# Patient Record
Sex: FEMALE | Race: OTHER
[De-identification: names, ages, dates, MRNs, and addresses within clinical notes are randomized per-mention and may not be internally consistent; named-entity substitution may affect disease eponyms.]

---

## 2017-01-18 ENCOUNTER — RECORD ABSTRACTING (OUTPATIENT)
Age: 42
End: 2017-01-18

## 2017-01-18 DIAGNOSIS — Z12.4 ENCOUNTER FOR SCREENING FOR MALIGNANT NEOPLASM OF CERVIX: ICD-10-CM

## 2017-02-02 ENCOUNTER — APPOINTMENT (OUTPATIENT)
Dept: INTERNAL MEDICINE | Facility: CLINIC | Age: 42
End: 2017-02-02

## 2017-02-02 VITALS
WEIGHT: 166 LBS | DIASTOLIC BLOOD PRESSURE: 68 MMHG | SYSTOLIC BLOOD PRESSURE: 107 MMHG | HEART RATE: 69 BPM | HEIGHT: 58 IN | BODY MASS INDEX: 34.85 KG/M2

## 2017-02-02 DIAGNOSIS — Z87.19 PERSONAL HISTORY OF OTHER DISEASES OF THE DIGESTIVE SYSTEM: ICD-10-CM

## 2017-02-02 DIAGNOSIS — Z83.3 FAMILY HISTORY OF DIABETES MELLITUS: ICD-10-CM

## 2017-02-08 ENCOUNTER — RESULT REVIEW (OUTPATIENT)
Age: 42
End: 2017-02-08

## 2017-02-09 LAB
ALBUMIN SERPL-MCNC: 4.2 G/DL
ALBUMIN/GLOB SERPL: 1.45
ALP SERPL-CCNC: 101 IU/L
ALT SERPL-CCNC: 29 IU/L
ANION GAP SERPL CALC-SCNC: 8 MEQ/L
AST SERPL-CCNC: 24 IU/L
BASOPHILS # BLD: 0.02 TH/MM3
BASOPHILS NFR BLD: 0.3 %
BILIRUB SERPL-MCNC: 0.4 MG/DL
BUN SERPL-MCNC: 7 MG/DL
BUN/CREAT SERPL: 11.1 %
CALCIUM SERPL-MCNC: 9.2 MG/DL
CHLORIDE SERPL-SCNC: 108 MEQ/L
CHOLEST SERPL-MCNC: 205 MG/DL
CO2 SERPL-SCNC: 23 MEQ/L
CREAT SERPL-MCNC: 0.63 MG/DL
EOSINOPHIL # BLD: 0.19 TH/MM3
EOSINOPHIL NFR BLD: 2.6 %
ERYTHROCYTE [DISTWIDTH] IN BLOOD BY AUTOMATED COUNT: 14.7 %
ESTIMATED AVERGAGE GLUCOSE (NORTH): 111 MG/DL
GFR SERPL CREATININE-BSD FRML MDRD: 105
GLUCOSE SERPL-MCNC: 85 MG/DL
GRANULOCYTES # BLD: 4.57 TH/MM3
GRANULOCYTES NFR BLD: 63.2 %
HBA1C MFR BLD: 5.5 %
HCT VFR BLD AUTO: 39.8 %
HDLC SERPL-MCNC: 69 MG/DL
HDLC SERPL: 2.97
HGB BLD-MCNC: 12.8 G/DL
IMM GRANULOCYTES # BLD: 0.02 TH/MM3
IMM GRANULOCYTES NFR BLD: 0.3 %
LDLC SERPL DIRECT ASSAY-MCNC: 126 MG/DL
LYMPHOCYTES # BLD: 2.03 TH/MM3
LYMPHOCYTES NFR BLD: 28.1 %
MCH RBC QN AUTO: 26.2 PG
MCHC RBC AUTO-ENTMCNC: 32.2 G/DL
MCV RBC AUTO: 81.4 FL
MONOCYTES # BLD: 0.4 TH/MM3
MONOCYTES NFR BLD: 5.5 %
PLATELET # BLD: 260 TH/MM3
PMV BLD AUTO: 11.4 FL
POTASSIUM SERPL-SCNC: 4 MMOL/L
PROT SERPL-MCNC: 7.1 G/DL
RBC # BLD AUTO: 4.89 MIL/MM3
SODIUM SERPL-SCNC: 139 MEQ/L
TRIGL SERPL-MCNC: 69 MG/DL
TSH SERPL DL<=0.005 MIU/L-ACNC: 2.12 UIU/ML
VLDLC SERPL-MCNC: 13 MG/DL
WBC # BLD: 7.23 TH/MM3

## 2017-02-13 LAB
1,25(OH)2D2 SERPL-MCNC: < 8 PG/ML
1,25(OH)2D3 SERPL-MCNC: 68 PG/ML
25(OH)D3+25(OH)D2 SERPL-MCNC: 68 PG/ML

## 2017-02-16 ENCOUNTER — APPOINTMENT (OUTPATIENT)
Dept: INTERNAL MEDICINE | Facility: CLINIC | Age: 42
End: 2017-02-16

## 2017-03-06 ENCOUNTER — APPOINTMENT (OUTPATIENT)
Dept: OBGYN | Facility: CLINIC | Age: 42
End: 2017-03-06

## 2017-03-08 ENCOUNTER — OTHER (OUTPATIENT)
Age: 42
End: 2017-03-08

## 2017-04-03 ENCOUNTER — APPOINTMENT (OUTPATIENT)
Dept: OBGYN | Facility: CLINIC | Age: 42
End: 2017-04-03

## 2017-04-03 ENCOUNTER — OUTPATIENT (OUTPATIENT)
Dept: OUTPATIENT SERVICES | Facility: HOSPITAL | Age: 42
LOS: 1 days | Discharge: HOME | End: 2017-04-03

## 2017-04-03 VITALS — WEIGHT: 164 LBS | DIASTOLIC BLOOD PRESSURE: 64 MMHG | BODY MASS INDEX: 34.28 KG/M2 | SYSTOLIC BLOOD PRESSURE: 110 MMHG

## 2017-04-04 ENCOUNTER — RESULT REVIEW (OUTPATIENT)
Age: 42
End: 2017-04-04

## 2017-04-14 ENCOUNTER — RECORD ABSTRACTING (OUTPATIENT)
Age: 42
End: 2017-04-14

## 2017-04-14 DIAGNOSIS — Z87.19 PERSONAL HISTORY OF OTHER DISEASES OF THE DIGESTIVE SYSTEM: ICD-10-CM

## 2017-04-14 DIAGNOSIS — N63 UNSPECIFIED LUMP IN BREAST: ICD-10-CM

## 2017-04-17 LAB — HPV E6+E7 MRNA CVX QL NAA+PROBE: NOT DETECTED

## 2017-06-27 DIAGNOSIS — Z01.419 ENCOUNTER FOR GYNECOLOGICAL EXAMINATION (GENERAL) (ROUTINE) WITHOUT ABNORMAL FINDINGS: ICD-10-CM

## 2017-08-24 ENCOUNTER — OUTPATIENT (OUTPATIENT)
Dept: OUTPATIENT SERVICES | Facility: HOSPITAL | Age: 42
LOS: 1 days | Discharge: HOME | End: 2017-08-24

## 2017-08-24 DIAGNOSIS — O14.00 MILD TO MODERATE PRE-ECLAMPSIA, UNSPECIFIED TRIMESTER: ICD-10-CM

## 2017-12-28 ENCOUNTER — APPOINTMENT (OUTPATIENT)
Dept: INTERNAL MEDICINE | Facility: CLINIC | Age: 42
End: 2017-12-28

## 2017-12-28 ENCOUNTER — OUTPATIENT (OUTPATIENT)
Dept: OUTPATIENT SERVICES | Facility: HOSPITAL | Age: 42
LOS: 1 days | Discharge: HOME | End: 2017-12-28

## 2017-12-28 VITALS
WEIGHT: 166 LBS | HEIGHT: 58 IN | DIASTOLIC BLOOD PRESSURE: 74 MMHG | HEART RATE: 75 BPM | SYSTOLIC BLOOD PRESSURE: 121 MMHG | BODY MASS INDEX: 34.85 KG/M2

## 2017-12-28 DIAGNOSIS — O14.00 MILD TO MODERATE PRE-ECLAMPSIA, UNSPECIFIED TRIMESTER: ICD-10-CM

## 2018-01-03 ENCOUNTER — APPOINTMENT (OUTPATIENT)
Dept: SURGERY | Facility: CLINIC | Age: 43
End: 2018-01-03
Payer: COMMERCIAL

## 2018-01-03 VITALS
WEIGHT: 168 LBS | SYSTOLIC BLOOD PRESSURE: 118 MMHG | DIASTOLIC BLOOD PRESSURE: 76 MMHG | HEIGHT: 58 IN | BODY MASS INDEX: 35.26 KG/M2

## 2018-01-03 PROCEDURE — 99213 OFFICE O/P EST LOW 20 MIN: CPT

## 2018-03-15 ENCOUNTER — APPOINTMENT (OUTPATIENT)
Dept: INTERNAL MEDICINE | Facility: CLINIC | Age: 43
End: 2018-03-15

## 2018-03-30 ENCOUNTER — APPOINTMENT (OUTPATIENT)
Dept: PULMONOLOGY | Facility: CLINIC | Age: 43
End: 2018-03-30

## 2018-10-01 ENCOUNTER — FORM ENCOUNTER (OUTPATIENT)
Age: 43
End: 2018-10-01

## 2018-10-02 ENCOUNTER — OUTPATIENT (OUTPATIENT)
Dept: OUTPATIENT SERVICES | Facility: HOSPITAL | Age: 43
LOS: 1 days | Discharge: HOME | End: 2018-10-02

## 2018-10-02 ENCOUNTER — APPOINTMENT (OUTPATIENT)
Dept: INTERNAL MEDICINE | Facility: CLINIC | Age: 43
End: 2018-10-02

## 2018-10-02 VITALS
HEIGHT: 58 IN | HEART RATE: 90 BPM | WEIGHT: 170 LBS | SYSTOLIC BLOOD PRESSURE: 118 MMHG | DIASTOLIC BLOOD PRESSURE: 79 MMHG | BODY MASS INDEX: 35.68 KG/M2

## 2018-10-02 DIAGNOSIS — M25.561 PAIN IN RIGHT KNEE: ICD-10-CM

## 2018-10-02 DIAGNOSIS — Z87.898 PERSONAL HISTORY OF OTHER SPECIFIED CONDITIONS: ICD-10-CM

## 2018-10-02 DIAGNOSIS — M25.562 PAIN IN LEFT KNEE: ICD-10-CM

## 2018-10-02 DIAGNOSIS — R06.02 SHORTNESS OF BREATH: ICD-10-CM

## 2018-10-02 RX ORDER — COPPER 313.4 MG/1
INTRAUTERINE DEVICE INTRAUTERINE
Refills: 0 | Status: DISCONTINUED | COMMUNITY
End: 2018-10-02

## 2018-10-02 NOTE — HISTORY OF PRESENT ILLNESS
[de-identified] : This is a 42 year old female patient with hx of DLD, obesity presenting for follow up.\par At this visit she is only complaining of pain in bilateral knees and sometimes in the thighs and calfs as well. The pain is worse when climbing the stairs. No morning stiffness. She takes naproxen sometimes which helps with the pain. No fever, chills or constitutional symptoms.

## 2018-10-02 NOTE — PHYSICAL EXAM

## 2018-10-02 NOTE — ASSESSMENT
[FreeTextEntry1] : This is a 42 year old female patient with hx of DLD, obesity presenting for follow up.\par \par #bilateral knee pain\par may be related to OA and obesity\par will get xray of the knees B/L\par \par #obesity/ DLD\par advised to lose weight\par counseled about diet/ exercise\par repeat lipid panel\par \par #scalp nodule\par seen by GS in January 2018, likely lipoma\par recommend monitoring and resection if enlarging\par \par #HCM\par flu vaccine given today\par PAP up to date\par will refer for screening mammography\par routine labs\par f/u in 6 months

## 2018-10-03 DIAGNOSIS — D17.9 BENIGN LIPOMATOUS NEOPLASM, UNSPECIFIED: ICD-10-CM

## 2018-10-03 DIAGNOSIS — E78.5 HYPERLIPIDEMIA, UNSPECIFIED: ICD-10-CM

## 2018-10-03 DIAGNOSIS — Z23 ENCOUNTER FOR IMMUNIZATION: ICD-10-CM

## 2018-10-10 ENCOUNTER — LABORATORY RESULT (OUTPATIENT)
Age: 43
End: 2018-10-10

## 2018-10-11 ENCOUNTER — FORM ENCOUNTER (OUTPATIENT)
Age: 43
End: 2018-10-11

## 2018-10-11 LAB
25(OH)D3 SERPL-MCNC: 11 NG/ML
ALBUMIN SERPL ELPH-MCNC: 4.5 G/DL
ALP BLD-CCNC: 85 U/L
ALT SERPL-CCNC: 32 U/L
ANION GAP SERPL CALC-SCNC: 14 MMOL/L
AST SERPL-CCNC: 22 U/L
BILIRUB SERPL-MCNC: 0.2 MG/DL
BUN SERPL-MCNC: 8 MG/DL
CALCIUM SERPL-MCNC: 8.5 MG/DL
CHLORIDE SERPL-SCNC: 105 MMOL/L
CHOLEST SERPL-MCNC: 169 MG/DL
CHOLEST/HDLC SERPL: 3.4 RATIO
CO2 SERPL-SCNC: 21 MMOL/L
CREAT SERPL-MCNC: 0.5 MG/DL
GLUCOSE SERPL-MCNC: 92 MG/DL
HDLC SERPL-MCNC: 50 MG/DL
LDLC SERPL CALC-MCNC: 119 MG/DL
POTASSIUM SERPL-SCNC: 4.2 MMOL/L
PROT SERPL-MCNC: 7.2 G/DL
SODIUM SERPL-SCNC: 140 MMOL/L
TRIGL SERPL-MCNC: 81 MG/DL
TSH SERPL-ACNC: 1.88 UIU/ML

## 2018-10-12 ENCOUNTER — OUTPATIENT (OUTPATIENT)
Dept: OUTPATIENT SERVICES | Facility: HOSPITAL | Age: 43
LOS: 1 days | Discharge: HOME | End: 2018-10-12

## 2018-10-12 DIAGNOSIS — Z12.31 ENCOUNTER FOR SCREENING MAMMOGRAM FOR MALIGNANT NEOPLASM OF BREAST: ICD-10-CM

## 2018-10-17 ENCOUNTER — APPOINTMENT (OUTPATIENT)
Dept: NUTRITION | Facility: CLINIC | Age: 43
End: 2018-10-17

## 2018-10-17 VITALS — HEIGHT: 57 IN | BODY MASS INDEX: 38.4 KG/M2 | WEIGHT: 178 LBS

## 2018-11-12 ENCOUNTER — RESULT CHARGE (OUTPATIENT)
Age: 43
End: 2018-11-12

## 2018-11-12 ENCOUNTER — APPOINTMENT (OUTPATIENT)
Dept: OBGYN | Facility: CLINIC | Age: 43
End: 2018-11-12

## 2018-11-12 ENCOUNTER — OUTPATIENT (OUTPATIENT)
Dept: OUTPATIENT SERVICES | Facility: HOSPITAL | Age: 43
LOS: 1 days | Discharge: HOME | End: 2018-11-12

## 2018-11-12 VITALS
BODY MASS INDEX: 38.62 KG/M2 | DIASTOLIC BLOOD PRESSURE: 70 MMHG | HEIGHT: 57 IN | SYSTOLIC BLOOD PRESSURE: 108 MMHG | WEIGHT: 179 LBS

## 2018-11-12 DIAGNOSIS — Z30.432 ENCOUNTER FOR REMOVAL OF INTRAUTERINE CONTRACEPTIVE DEVICE: ICD-10-CM

## 2018-11-12 RX ADMIN — NORGESTIMATE AND ETHINYL ESTRADIOL 0 MG-25 MCG: KIT at 00:00

## 2018-11-13 LAB
HCG UR QL: NEGATIVE
QUALITY CONTROL: YES

## 2018-11-14 DIAGNOSIS — Z01.419 ENCOUNTER FOR GYNECOLOGICAL EXAMINATION (GENERAL) (ROUTINE) WITHOUT ABNORMAL FINDINGS: ICD-10-CM

## 2018-11-14 DIAGNOSIS — Z30.432 ENCOUNTER FOR REMOVAL OF INTRAUTERINE CONTRACEPTIVE DEVICE: ICD-10-CM

## 2019-01-16 ENCOUNTER — APPOINTMENT (OUTPATIENT)
Dept: NUTRITION | Facility: CLINIC | Age: 44
End: 2019-01-16

## 2019-01-16 VITALS — WEIGHT: 178 LBS | BODY MASS INDEX: 38.4 KG/M2 | HEIGHT: 57 IN

## 2019-03-13 LAB
BASOPHILS # BLD AUTO: 0.04 K/UL
BASOPHILS NFR BLD AUTO: 0.5 %
EOSINOPHIL # BLD AUTO: 0.26 K/UL
EOSINOPHIL NFR BLD AUTO: 3.6 %
HCT VFR BLD CALC: 36.6 %
HGB BLD-MCNC: 11.9 G/DL
IMM GRANULOCYTES NFR BLD AUTO: 0.4 %
LYMPHOCYTES # BLD AUTO: 1.78 K/UL
LYMPHOCYTES NFR BLD AUTO: 24.4 %
MAN DIFF?: NORMAL
MCHC RBC-ENTMCNC: 26.2 PG
MCHC RBC-ENTMCNC: 32.5 G/DL
MCV RBC AUTO: 80.6 FL
MONOCYTES # BLD AUTO: 0.44 K/UL
MONOCYTES NFR BLD AUTO: 6 %
NEUTROPHILS # BLD AUTO: 4.75 K/UL
NEUTROPHILS NFR BLD AUTO: 65.1 %
PLATELET # BLD AUTO: 257 K/UL
RBC # BLD: 4.54 M/UL
RBC # FLD: 14.2 %
WBC # FLD AUTO: 7.3 K/UL

## 2019-03-20 ENCOUNTER — APPOINTMENT (OUTPATIENT)
Dept: INTERNAL MEDICINE | Facility: CLINIC | Age: 44
End: 2019-03-20

## 2019-05-09 ENCOUNTER — APPOINTMENT (OUTPATIENT)
Dept: INTERNAL MEDICINE | Facility: CLINIC | Age: 44
End: 2019-05-09

## 2019-05-09 ENCOUNTER — OUTPATIENT (OUTPATIENT)
Dept: OUTPATIENT SERVICES | Facility: HOSPITAL | Age: 44
LOS: 1 days | Discharge: HOME | End: 2019-05-09

## 2019-05-09 VITALS
DIASTOLIC BLOOD PRESSURE: 78 MMHG | HEART RATE: 67 BPM | SYSTOLIC BLOOD PRESSURE: 118 MMHG | WEIGHT: 179 LBS | BODY MASS INDEX: 38.62 KG/M2 | HEIGHT: 57 IN

## 2019-05-09 DIAGNOSIS — D17.0 BENIGN LIPOMATOUS NEOPLASM OF SKIN AND SUBCUTANEOUS TISSUE OF HEAD, FACE AND NECK: ICD-10-CM

## 2019-05-09 NOTE — ASSESSMENT
[FreeTextEntry1] : Patient is a 44y/o Obese Female with medical Hx Dyslipidemia, B/L Knee Osteoarthritis  presenting to the clinic for a follow up visit \par \par Morbid Obesity: BMI:38.7\par -Counselled on Better food choices and  Exercises\par \par  Dyslipidemia\par -Last Lipid panel Improved\par -will send new labs\par \par B/l Knee OA\par -Shown on X-ray\par -Weight loss and tylenol advised for pain. \par \par Acid Reflux \par -Improves with OTC PPI(Prilosec)\par -C/W PPI\par \par Suspect Vit D Insufficiency\par -VIT D 25-OH from 2018: 11\par -Will recheck and call patient if value is still low \par \par Scalp Mass:\par Now it is increasing and painful so will get sono and refer back to GS. I explained everything in German and pt verbalized understanding that it is important to f/up with general surgery. \par \par HCM\par -Routine labs ordered\par -JONATHAN done 10/12/2018: No malignancy: give referral on next visit \par -Pap: 2017:Unremarkable,  HPV neg: Repeat 2020\par -RTC in 6 Months\par \par

## 2019-05-09 NOTE — REVIEW OF SYSTEMS
[Heartburn] : heartburn [Joint Pain] : joint pain [Fever] : no fever [Chills] : no chills [Fatigue] : no fatigue [Night Sweats] : no night sweats [Chest Pain] : no chest pain [Palpitations] : no palpitations [Lower Ext Edema] : no lower extremity edema [Shortness Of Breath] : no shortness of breath [Wheezing] : no wheezing [Cough] : no cough [Dyspnea on Exertion] : no dyspnea on exertion [Abdominal Pain] : no abdominal pain [Nausea] : no nausea [Diarrhea] : diarrhea [FreeTextEntry7] : Abdominal Obesity, Acid reflux [FreeTextEntry9] : B/L knee pain worse on the rIght

## 2019-05-09 NOTE — HISTORY OF PRESENT ILLNESS
[FreeTextEntry1] : Follow-up Visit [de-identified] : Patient is a 42y/o Obese Female with medical Hx Dyslipidemia, Newly diagnosed B/L Knee Osteoarthritis, presenting to the clinic for a follow up visit. she is complaining of acid reflux and has been talking OTC Prilosec which has been helping. She is also complaining of B/L Knee pain which is worse on the Right. She takes no other medication.\par Scalp mass is increasing in size and now it is painful also.

## 2019-05-09 NOTE — PHYSICAL EXAM
[No Acute Distress] : no acute distress [Well Nourished] : well nourished [Well-Appearing] : well-appearing [No Respiratory Distress] : no respiratory distress  [No Accessory Muscle Use] : no accessory muscle use [Clear to Auscultation] : lungs were clear to auscultation bilaterally [Normal Rate] : normal rate  [Regular Rhythm] : with a regular rhythm [Normal S1, S2] : normal S1 and S2 [Soft] : abdomen soft [Non Tender] : non-tender [Non-distended] : non-distended [No Joint Swelling] : no joint swelling [de-identified] : Pain at medial Knee on the right  [de-identified] : Scalp: Mass palpable.

## 2019-05-13 ENCOUNTER — APPOINTMENT (OUTPATIENT)
Dept: OBGYN | Facility: CLINIC | Age: 44
End: 2019-05-13

## 2019-05-13 DIAGNOSIS — M17.0 BILATERAL PRIMARY OSTEOARTHRITIS OF KNEE: ICD-10-CM

## 2019-05-13 DIAGNOSIS — E78.5 HYPERLIPIDEMIA, UNSPECIFIED: ICD-10-CM

## 2019-05-13 DIAGNOSIS — R22.9 LOCALIZED SWELLING, MASS AND LUMP, UNSPECIFIED: ICD-10-CM

## 2019-05-15 ENCOUNTER — APPOINTMENT (OUTPATIENT)
Dept: OBGYN | Facility: CLINIC | Age: 44
End: 2019-05-15

## 2019-05-30 LAB
BASOPHILS # BLD AUTO: 0.03 K/UL
BASOPHILS NFR BLD AUTO: 0.4 %
EOSINOPHIL # BLD AUTO: 0.21 K/UL
EOSINOPHIL NFR BLD AUTO: 2.8 %
HCT VFR BLD CALC: 38.6 %
HGB BLD-MCNC: 12.7 G/DL
IMM GRANULOCYTES NFR BLD AUTO: 0.3 %
LYMPHOCYTES # BLD AUTO: 1.74 K/UL
LYMPHOCYTES NFR BLD AUTO: 22.9 %
MAN DIFF?: NORMAL
MCHC RBC-ENTMCNC: 26.2 PG
MCHC RBC-ENTMCNC: 32.9 G/DL
MCV RBC AUTO: 79.8 FL
MONOCYTES # BLD AUTO: 0.44 K/UL
MONOCYTES NFR BLD AUTO: 5.8 %
NEUTROPHILS # BLD AUTO: 5.15 K/UL
NEUTROPHILS NFR BLD AUTO: 67.8 %
PLATELET # BLD AUTO: 250 K/UL
RBC # BLD: 4.84 M/UL
RBC # FLD: 13.6 %
WBC # FLD AUTO: 7.59 K/UL

## 2019-06-01 LAB
25(OH)D3 SERPL-MCNC: 13 NG/ML
ALBUMIN SERPL ELPH-MCNC: 4.3 G/DL
ALP BLD-CCNC: 91 U/L
ALT SERPL-CCNC: 27 U/L
ANION GAP SERPL CALC-SCNC: 11 MMOL/L
AST SERPL-CCNC: 19 U/L
BILIRUB SERPL-MCNC: <0.2 MG/DL
BUN SERPL-MCNC: 8 MG/DL
CALCIUM SERPL-MCNC: 8.5 MG/DL
CHLORIDE SERPL-SCNC: 106 MMOL/L
CHOLEST SERPL-MCNC: 180 MG/DL
CHOLEST/HDLC SERPL: 3.3 RATIO
CO2 SERPL-SCNC: 23 MMOL/L
CREAT SERPL-MCNC: 0.5 MG/DL
ESTIMATED AVERAGE GLUCOSE: 131 MG/DL
GLUCOSE SERPL-MCNC: 93 MG/DL
HBA1C MFR BLD HPLC: 6.2 %
HDLC SERPL-MCNC: 54 MG/DL
LDLC SERPL CALC-MCNC: 125 MG/DL
POTASSIUM SERPL-SCNC: 4.2 MMOL/L
PROT SERPL-MCNC: 7.3 G/DL
SODIUM SERPL-SCNC: 140 MMOL/L
TRIGL SERPL-MCNC: 95 MG/DL
TSH SERPL-ACNC: 1.52 UIU/ML

## 2019-06-10 ENCOUNTER — FORM ENCOUNTER (OUTPATIENT)
Age: 44
End: 2019-06-10

## 2019-06-11 ENCOUNTER — OUTPATIENT (OUTPATIENT)
Dept: OUTPATIENT SERVICES | Facility: HOSPITAL | Age: 44
LOS: 1 days | Discharge: HOME | End: 2019-06-11

## 2019-06-11 ENCOUNTER — OUTPATIENT (OUTPATIENT)
Dept: OUTPATIENT SERVICES | Facility: HOSPITAL | Age: 44
LOS: 1 days | Discharge: HOME | End: 2019-06-11
Payer: SUBSIDIZED

## 2019-06-11 DIAGNOSIS — D17.0 BENIGN LIPOMATOUS NEOPLASM OF SKIN AND SUBCUTANEOUS TISSUE OF HEAD, FACE AND NECK: ICD-10-CM

## 2019-06-11 PROCEDURE — 76536 US EXAM OF HEAD AND NECK: CPT | Mod: 26

## 2019-06-12 ENCOUNTER — APPOINTMENT (OUTPATIENT)
Dept: SURGERY | Facility: CLINIC | Age: 44
End: 2019-06-12
Payer: SUBSIDIZED

## 2019-06-12 VITALS
DIASTOLIC BLOOD PRESSURE: 76 MMHG | BODY MASS INDEX: 39.48 KG/M2 | SYSTOLIC BLOOD PRESSURE: 116 MMHG | HEIGHT: 57 IN | WEIGHT: 183 LBS

## 2019-06-12 PROCEDURE — 99214 OFFICE O/P EST MOD 30 MIN: CPT

## 2019-06-12 NOTE — HISTORY OF PRESENT ILLNESS
[de-identified] : Ivone is a 42 year old lady who had excision of a mass in the scalp that was diagnosed as schwannoma on pathology in 2015. She now presents with this asymptomatic nodule in the back of her scalp. This is barely palpable and does not bother her. This is unlike the first mass that was excised as the previous one was painful and this is painless. \par \par This time she feels some tenderness and causing headache.

## 2019-06-12 NOTE — PHYSICAL EXAM
[Respiratory Effort] : normal respiratory effort [Normal Rate and Rhythm] : normal rate and rhythm [Oriented to Person] : oriented to person [Calm] : calm [Abdominal Masses] : No abdominal masses [JVD] : no jugular venous distention  [Tender] : was nontender [Stool Sample Taken] : No stool obtained  on rectal exam [de-identified] : Normal [Purpura] : no purpura  [de-identified] : Normal [de-identified] : Normal [de-identified] : Normal except  1 inch schar from the first surgery and a less than 1 cm painless nodule a inch below it.

## 2019-06-12 NOTE — PLAN
[FreeTextEntry1] : Follow up in 6 moths to a years time. \par If it becomes symptomatic she should come back earlier. \par

## 2019-06-12 NOTE — ASSESSMENT
[FreeTextEntry1] : Ivone is a 42 year old lady who had excision of a mass in the scalp that was diagnosed as schwannoma on pathology in 2015. She now presents with this asymptomatic nodule in the back of her scalp. This is barely palpable and does not bother her. This is unlike the first mass that was excised as the previous one was painful and this is painless. \par \par This time she feels some tenderness and causing headache. \par \par We discussed this is most probably a benign process and as it is not bothering her surgical resection would have questionable benefit.\par She wants it removed. \par \par We explained in great detail the pathophysiology of the disease process. We star diagrams and discussed the workup for diagnosis and management.\par The Procedure was explained to the patient. The Risk , benefit and alternatives were discussed. We discussed recovery and possible complications.\par The Post operative care was explained to the patient. She was counselled on diet , exercise and wound care.\par We discussed the pathology and surgery with her.\par \par We will schedule her for excsion of scalp mass\par \par We discussed the importance of close follow up. \par We informed that she needs to follow up in OR  .\par We also informed that she can call us if anything changes or has any questions. \par \par We explained in great detail the pathophysiology of the disease process. We star diagrams and discussed the workup for diagnosis and management.\par The Procedure was explained to the patient. The Risk , benefit and alternatives were discussed. We discussed recovery and possible complications.\par The Post operative care was explained to the patient. She was counselled on diet , exercise and wound care.\par We discussed the pathology and surgery with her.\par \par We discussed the importance of close follow up. \par We informed that she needs to follow up in OR. \par We also informed that she can call us if anything changes or has any questions.\par

## 2019-06-21 ENCOUNTER — OUTPATIENT (OUTPATIENT)
Dept: OUTPATIENT SERVICES | Facility: HOSPITAL | Age: 44
LOS: 1 days | Discharge: HOME | End: 2019-06-21
Payer: SUBSIDIZED

## 2019-06-21 VITALS
TEMPERATURE: 98 F | OXYGEN SATURATION: 100 % | DIASTOLIC BLOOD PRESSURE: 78 MMHG | RESPIRATION RATE: 22 BRPM | WEIGHT: 182.1 LBS | HEART RATE: 72 BPM | SYSTOLIC BLOOD PRESSURE: 115 MMHG

## 2019-06-21 DIAGNOSIS — Z01.818 ENCOUNTER FOR OTHER PREPROCEDURAL EXAMINATION: ICD-10-CM

## 2019-06-21 DIAGNOSIS — R22.0 LOCALIZED SWELLING, MASS AND LUMP, HEAD: ICD-10-CM

## 2019-06-21 DIAGNOSIS — R22.0 LOCALIZED SWELLING, MASS AND LUMP, HEAD: Chronic | ICD-10-CM

## 2019-06-21 LAB
ALBUMIN SERPL ELPH-MCNC: 4 G/DL — SIGNIFICANT CHANGE UP (ref 3.5–5.2)
ALP SERPL-CCNC: 89 U/L — SIGNIFICANT CHANGE UP (ref 30–115)
ALT FLD-CCNC: 27 U/L — SIGNIFICANT CHANGE UP (ref 0–41)
ANION GAP SERPL CALC-SCNC: 11 MMOL/L — SIGNIFICANT CHANGE UP (ref 7–14)
APTT BLD: 33.5 SEC — SIGNIFICANT CHANGE UP (ref 27–39.2)
AST SERPL-CCNC: 19 U/L — SIGNIFICANT CHANGE UP (ref 0–41)
BASOPHILS # BLD AUTO: 0.02 K/UL — SIGNIFICANT CHANGE UP (ref 0–0.2)
BASOPHILS NFR BLD AUTO: 0.3 % — SIGNIFICANT CHANGE UP (ref 0–1)
BILIRUB SERPL-MCNC: 0.2 MG/DL — SIGNIFICANT CHANGE UP (ref 0.2–1.2)
BUN SERPL-MCNC: 8 MG/DL — LOW (ref 10–20)
CALCIUM SERPL-MCNC: 8.8 MG/DL — SIGNIFICANT CHANGE UP (ref 8.5–10.1)
CHLORIDE SERPL-SCNC: 107 MMOL/L — SIGNIFICANT CHANGE UP (ref 98–110)
CO2 SERPL-SCNC: 21 MMOL/L — SIGNIFICANT CHANGE UP (ref 17–32)
CREAT SERPL-MCNC: 0.5 MG/DL — LOW (ref 0.7–1.5)
EOSINOPHIL # BLD AUTO: 0.23 K/UL — SIGNIFICANT CHANGE UP (ref 0–0.7)
EOSINOPHIL NFR BLD AUTO: 3.2 % — SIGNIFICANT CHANGE UP (ref 0–8)
GLUCOSE SERPL-MCNC: 91 MG/DL — SIGNIFICANT CHANGE UP (ref 70–99)
HCT VFR BLD CALC: 38.1 % — SIGNIFICANT CHANGE UP (ref 37–47)
HGB BLD-MCNC: 12.5 G/DL — SIGNIFICANT CHANGE UP (ref 12–16)
IMM GRANULOCYTES NFR BLD AUTO: 0.3 % — SIGNIFICANT CHANGE UP (ref 0.1–0.3)
INR BLD: 0.98 RATIO — SIGNIFICANT CHANGE UP (ref 0.65–1.3)
LYMPHOCYTES # BLD AUTO: 1.57 K/UL — SIGNIFICANT CHANGE UP (ref 1.2–3.4)
LYMPHOCYTES # BLD AUTO: 21.8 % — SIGNIFICANT CHANGE UP (ref 20.5–51.1)
MCHC RBC-ENTMCNC: 26.3 PG — LOW (ref 27–31)
MCHC RBC-ENTMCNC: 32.8 G/DL — SIGNIFICANT CHANGE UP (ref 32–37)
MCV RBC AUTO: 80.2 FL — LOW (ref 81–99)
MONOCYTES # BLD AUTO: 0.47 K/UL — SIGNIFICANT CHANGE UP (ref 0.1–0.6)
MONOCYTES NFR BLD AUTO: 6.5 % — SIGNIFICANT CHANGE UP (ref 1.7–9.3)
NEUTROPHILS # BLD AUTO: 4.89 K/UL — SIGNIFICANT CHANGE UP (ref 1.4–6.5)
NEUTROPHILS NFR BLD AUTO: 67.9 % — SIGNIFICANT CHANGE UP (ref 42.2–75.2)
NRBC # BLD: 0 /100 WBCS — SIGNIFICANT CHANGE UP (ref 0–0)
PLATELET # BLD AUTO: 241 K/UL — SIGNIFICANT CHANGE UP (ref 130–400)
POTASSIUM SERPL-MCNC: 4.2 MMOL/L — SIGNIFICANT CHANGE UP (ref 3.5–5)
POTASSIUM SERPL-SCNC: 4.2 MMOL/L — SIGNIFICANT CHANGE UP (ref 3.5–5)
PROT SERPL-MCNC: 6.9 G/DL — SIGNIFICANT CHANGE UP (ref 6–8)
PROTHROM AB SERPL-ACNC: 11.3 SEC — SIGNIFICANT CHANGE UP (ref 9.95–12.87)
RBC # BLD: 4.75 M/UL — SIGNIFICANT CHANGE UP (ref 4.2–5.4)
RBC # FLD: 13.7 % — SIGNIFICANT CHANGE UP (ref 11.5–14.5)
SODIUM SERPL-SCNC: 139 MMOL/L — SIGNIFICANT CHANGE UP (ref 135–146)
WBC # BLD: 7.2 K/UL — SIGNIFICANT CHANGE UP (ref 4.8–10.8)
WBC # FLD AUTO: 7.2 K/UL — SIGNIFICANT CHANGE UP (ref 4.8–10.8)

## 2019-06-21 PROCEDURE — 93010 ELECTROCARDIOGRAM REPORT: CPT

## 2019-06-21 NOTE — H&P PST ADULT - HISTORY OF PRESENT ILLNESS
44 y/o female scheduled for excision of scalp mass reports she has had this mass x 1 yr  reports it has increases in size  reports no c/o cp,sob,palpitations,cough or dysuria  1-2 fos without sob

## 2019-07-01 PROBLEM — K58.9 IRRITABLE BOWEL SYNDROME, UNSPECIFIED: Chronic | Status: ACTIVE | Noted: 2019-06-21

## 2019-07-01 PROBLEM — K58.9 IRRITABLE BOWEL SYNDROME WITHOUT DIARRHEA: Chronic | Status: ACTIVE | Noted: 2019-06-21

## 2019-07-02 ENCOUNTER — RESULT REVIEW (OUTPATIENT)
Age: 44
End: 2019-07-02

## 2019-07-02 ENCOUNTER — APPOINTMENT (OUTPATIENT)
Dept: SURGERY | Facility: AMBULATORY SURGERY CENTER | Age: 44
End: 2019-07-02

## 2019-07-02 ENCOUNTER — OUTPATIENT (OUTPATIENT)
Dept: OUTPATIENT SERVICES | Facility: HOSPITAL | Age: 44
LOS: 1 days | Discharge: HOME | End: 2019-07-02
Payer: SUBSIDIZED

## 2019-07-02 VITALS
OXYGEN SATURATION: 96 % | DIASTOLIC BLOOD PRESSURE: 67 MMHG | HEART RATE: 70 BPM | RESPIRATION RATE: 18 BRPM | TEMPERATURE: 98 F | SYSTOLIC BLOOD PRESSURE: 114 MMHG

## 2019-07-02 VITALS
OXYGEN SATURATION: 98 % | DIASTOLIC BLOOD PRESSURE: 63 MMHG | TEMPERATURE: 99 F | SYSTOLIC BLOOD PRESSURE: 122 MMHG | HEART RATE: 67 BPM | RESPIRATION RATE: 16 BRPM | WEIGHT: 182.1 LBS

## 2019-07-02 DIAGNOSIS — R22.0 LOCALIZED SWELLING, MASS AND LUMP, HEAD: Chronic | ICD-10-CM

## 2019-07-02 DIAGNOSIS — Z98.891 HISTORY OF UTERINE SCAR FROM PREVIOUS SURGERY: Chronic | ICD-10-CM

## 2019-07-02 PROCEDURE — 21012 EXC FACE LES SBQ 2 CM/>: CPT

## 2019-07-02 PROCEDURE — 88305 TISSUE EXAM BY PATHOLOGIST: CPT | Mod: 26

## 2019-07-02 RX ORDER — SODIUM CHLORIDE 9 MG/ML
1000 INJECTION, SOLUTION INTRAVENOUS
Refills: 0 | Status: DISCONTINUED | OUTPATIENT
Start: 2019-07-02 | End: 2019-07-17

## 2019-07-02 RX ORDER — ONDANSETRON 8 MG/1
4 TABLET, FILM COATED ORAL ONCE
Refills: 0 | Status: DISCONTINUED | OUTPATIENT
Start: 2019-07-02 | End: 2019-07-17

## 2019-07-02 RX ORDER — OXYCODONE AND ACETAMINOPHEN 5; 325 MG/1; MG/1
1 TABLET ORAL ONCE
Refills: 0 | Status: DISCONTINUED | OUTPATIENT
Start: 2019-07-02 | End: 2019-07-02

## 2019-07-02 RX ORDER — HYDROMORPHONE HYDROCHLORIDE 2 MG/ML
0.5 INJECTION INTRAMUSCULAR; INTRAVENOUS; SUBCUTANEOUS
Refills: 0 | Status: DISCONTINUED | OUTPATIENT
Start: 2019-07-02 | End: 2019-07-02

## 2019-07-02 RX ADMIN — SODIUM CHLORIDE 100 MILLILITER(S): 9 INJECTION, SOLUTION INTRAVENOUS at 15:11

## 2019-07-02 NOTE — ASU DISCHARGE PLAN (ADULT/PEDIATRIC) - CARE PROVIDER_API CALL
Yumi Paulino)  Surgery  [address] [place] [address], [address] [place] [address]  Phone: [phone]  Fax: [phone]  Follow Up Time: 2 weeks

## 2019-07-02 NOTE — PRE-ANESTHESIA EVALUATION ADULT - NSANTHOSAYNRD_GEN_A_CORE
No. PAMELA screening performed.  STOP BANG Legend: 0-2 = LOW Risk; 3-4 = INTERMEDIATE Risk; 5-8 = HIGH Risk/see pamela tool

## 2019-07-02 NOTE — ASU DISCHARGE PLAN (ADULT/PEDIATRIC) - ASU DC SPECIAL INSTRUCTIONSFT
Please call number below to follow up in 2 weeks with Dr. Paulino    May resume showering in 48h    Please take acetaminophen (650) and ibuprofen (600) every 8h for the next 3 days, then may take as needed

## 2019-07-02 NOTE — BRIEF OPERATIVE NOTE - NSICDXBRIEFPROCEDURE_GEN_ALL_CORE_FT
PROCEDURES:  Excision, lesion, benign, scalp, 2.1 cm to 3.0 cm in diameter including margins 02-Jul-2019 15:13:55  Yumi Paulino

## 2019-07-02 NOTE — ASU DISCHARGE PLAN (ADULT/PEDIATRIC) - CALL YOUR DOCTOR IF YOU HAVE ANY OF THE FOLLOWING:
Numbness, tingling, color or temperature change to extremity/Pain not relieved by Medications/Bleeding that does not stop/Swelling that gets worse/Nausea and vomiting that does not stop/Wound/Surgical Site with redness, or foul smelling discharge or pus/Fever greater than (need to indicate Fahrenheit or Celsius)

## 2019-07-05 LAB — SURGICAL PATHOLOGY STUDY: SIGNIFICANT CHANGE UP

## 2019-07-10 DIAGNOSIS — D36.11 BENIGN NEOPLASM OF PERIPHERAL NERVES AND AUTONOMIC NERVOUS SYSTEM OF FACE, HEAD, AND NECK: ICD-10-CM

## 2019-07-10 DIAGNOSIS — R22.0 LOCALIZED SWELLING, MASS AND LUMP, HEAD: ICD-10-CM

## 2019-07-11 ENCOUNTER — APPOINTMENT (OUTPATIENT)
Dept: SURGERY | Facility: CLINIC | Age: 44
End: 2019-07-11
Payer: SUBSIDIZED

## 2019-07-11 VITALS
BODY MASS INDEX: 39.7 KG/M2 | DIASTOLIC BLOOD PRESSURE: 80 MMHG | SYSTOLIC BLOOD PRESSURE: 118 MMHG | WEIGHT: 184 LBS | HEIGHT: 57 IN

## 2019-07-11 PROCEDURE — 99024 POSTOP FOLLOW-UP VISIT: CPT

## 2019-07-11 NOTE — PHYSICAL EXAM
[JVD] : no jugular venous distention  [Respiratory Effort] : normal respiratory effort [Normal Rate and Rhythm] : normal rate and rhythm [Abdominal Masses] : No abdominal masses [Tender] : was nontender [Stool Sample Taken] : No stool obtained  on rectal exam [Purpura] : no purpura  [Oriented to Person] : oriented to person [de-identified] : Normal [Calm] : calm [de-identified] : Normal except  1 inch scar from the first surgery and sutures from the surgery  [de-identified] : Normal [de-identified] : Normal

## 2019-07-11 NOTE — HISTORY OF PRESENT ILLNESS
[de-identified] : Ivone is a 42 year old lady who had excision of a mass in the scalp that was diagnosed as schwannoma on pathology in 2015. She now presents with this asymptomatic nodule in the back of her scalp. This is barely palpable and does not bother her. This is unlike the first mass that was excised as the previous one was painful and this is painless. \par \par This time she feels some tenderness and causing headache. \par \par She had a resection on week ago. \par Sutures in place

## 2019-07-11 NOTE — ASSESSMENT
[FreeTextEntry1] : Ivone is a 42 year old lady who had excision of a mass in the scalp that was diagnosed as schwannoma on pathology in 2015. She now presents with this asymptomatic nodule in the back of her scalp. This is barely palpable and does not bother her. This is unlike the first mass that was excised as the previous one was painful and this is painless. \par \par This time she feels some tenderness and causing headache. \par \par We discussed this is most probably a benign process and as it is not bothering her surgical resection would have questionable benefit.\par She wanted it removed. \par it was excised. \par \par It is too early to remove .\par \par She will follow up in 1 week for suture removal.

## 2019-07-17 ENCOUNTER — APPOINTMENT (OUTPATIENT)
Dept: SURGERY | Facility: CLINIC | Age: 44
End: 2019-07-17
Payer: SUBSIDIZED

## 2019-07-17 VITALS
BODY MASS INDEX: 40.13 KG/M2 | HEIGHT: 57 IN | WEIGHT: 186 LBS | DIASTOLIC BLOOD PRESSURE: 80 MMHG | SYSTOLIC BLOOD PRESSURE: 130 MMHG

## 2019-07-17 PROCEDURE — 99024 POSTOP FOLLOW-UP VISIT: CPT

## 2019-07-17 NOTE — ASSESSMENT
[FreeTextEntry1] : Ivone is a 42 year old lady who had excision of a mass in the scalp that was diagnosed as schwannoma on pathology in 2015. She now presents with this asymptomatic nodule in the back of her scalp. This is barely palpable and does not bother her. This is unlike the first mass that was excised as the previous one was painful and this is painless. \par \par This time she feels some tenderness and causing headache.  She had a resection two weeks ago. Sutures in place. It has healed well.\par \par The sutures were removed. \par It has helaed well. \par \par We explained in great detail the pathophysiology of the disease process. We star diagrams and discussed the workup for diagnosis and management.\par The Post operative care was explained to the patient. She was counselled on diet , exercise and wound care.\par We discussed the pathology and surgery with her.\par \par We discussed the importance of close follow up. \par We informed that she needs to follow up as needed.\par We also informed that she can call us if anything changes or has any questions.\par

## 2019-07-17 NOTE — HISTORY OF PRESENT ILLNESS
[de-identified] : Ivone is a 42 year old lady who had excision of a mass in the scalp that was diagnosed as schwannoma on pathology in 2015. She now presents with this asymptomatic nodule in the back of her scalp. This is barely palpable and does not bother her. This is unlike the first mass that was excised as the previous one was painful and this is painless. \par \par This time she feels some tenderness and causing headache.  She had a resection two weeks ago. Sutures in place. It has healed well.\par

## 2019-07-17 NOTE — PHYSICAL EXAM
[JVD] : no jugular venous distention  [Respiratory Effort] : normal respiratory effort [Normal Rate and Rhythm] : normal rate and rhythm [Abdominal Masses] : No abdominal masses [Tender] : was nontender [Stool Sample Taken] : No stool obtained  on rectal exam [Purpura] : no purpura  [Oriented to Person] : oriented to person [Calm] : calm [de-identified] : Normal [de-identified] : Normal except  1 inch scar from the first surgery and sutures from the surgery  [de-identified] : Normal [de-identified] : Normal

## 2019-07-22 ENCOUNTER — APPOINTMENT (OUTPATIENT)
Dept: NUTRITION | Facility: CLINIC | Age: 44
End: 2019-07-22

## 2019-08-21 ENCOUNTER — APPOINTMENT (OUTPATIENT)
Dept: SURGERY | Facility: CLINIC | Age: 44
End: 2019-08-21
Payer: SUBSIDIZED

## 2019-08-21 VITALS
DIASTOLIC BLOOD PRESSURE: 70 MMHG | SYSTOLIC BLOOD PRESSURE: 110 MMHG | HEIGHT: 57 IN | BODY MASS INDEX: 40.56 KG/M2 | WEIGHT: 188 LBS

## 2019-08-21 PROCEDURE — 99024 POSTOP FOLLOW-UP VISIT: CPT

## 2019-08-28 NOTE — PHYSICAL EXAM
[JVD] : no jugular venous distention  [Normal Rate and Rhythm] : normal rate and rhythm [Respiratory Effort] : normal respiratory effort [Abdominal Masses] : No abdominal masses [Tender] : was nontender [Purpura] : no purpura  [Stool Sample Taken] : No stool obtained  on rectal exam [Oriented to Person] : oriented to person [Calm] : calm [de-identified] : Normal except  healing scar [de-identified] : Normal [de-identified] : Normal [de-identified] : Normal

## 2019-08-28 NOTE — ASSESSMENT
[FreeTextEntry1] : Ivone is a 42 year old lady who had excision of a mass in the scalp that was diagnosed as schwannoma on pathology in 2015. She now presents with this asymptomatic nodule in the back of her scalp. This is barely palpable and does not bother her. This is unlike the first mass that was excised as the previous one was painful and this is painless. \par \par This time she feels some tenderness and causing headache.  She had a resection two weeks ago. Sutures in place. It has healed well.\par 8/21: She feeling a mass again. It is not that painful. \par \par \par We explained in great detail the pathophysiology of the disease process. We star diagrams and discussed the workup for diagnosis and management.\par this could be a healing process.\par It could be a new neuroma forming. \par \par We will follow it for now. \par \par We discussed the importance of close follow up. \par We informed that she needs to follow up in a couple of months.\par We also informed that she can call us if anything changes or has any questions.\par

## 2019-08-28 NOTE — HISTORY OF PRESENT ILLNESS
[de-identified] : Ivone is a 42 year old lady who had excision of a mass in the scalp that was diagnosed as schwannoma on pathology in 2015. She now presents with this asymptomatic nodule in the back of her scalp. This is barely palpable and does not bother her. This is unlike the first mass that was excised as the previous one was painful and this is painless. \par \par This time she feels some tenderness and causing headache.  She had a resection two weeks ago. Sutures in place. It has healed well.\par 8/21: She feeling a mass again. It is not that painful. \par

## 2019-10-25 ENCOUNTER — APPOINTMENT (OUTPATIENT)
Dept: SURGERY | Facility: CLINIC | Age: 44
End: 2019-10-25

## 2020-06-18 ENCOUNTER — OUTPATIENT (OUTPATIENT)
Dept: OUTPATIENT SERVICES | Facility: HOSPITAL | Age: 45
LOS: 1 days | Discharge: HOME | End: 2020-06-18

## 2020-06-18 ENCOUNTER — APPOINTMENT (OUTPATIENT)
Dept: INTERNAL MEDICINE | Facility: CLINIC | Age: 45
End: 2020-06-18
Payer: COMMERCIAL

## 2020-06-18 VITALS
WEIGHT: 192 LBS | DIASTOLIC BLOOD PRESSURE: 92 MMHG | HEART RATE: 94 BPM | HEIGHT: 57 IN | TEMPERATURE: 98.4 F | BODY MASS INDEX: 41.42 KG/M2 | SYSTOLIC BLOOD PRESSURE: 138 MMHG

## 2020-06-18 DIAGNOSIS — Z98.891 HISTORY OF UTERINE SCAR FROM PREVIOUS SURGERY: Chronic | ICD-10-CM

## 2020-06-18 DIAGNOSIS — R22.0 LOCALIZED SWELLING, MASS AND LUMP, HEAD: Chronic | ICD-10-CM

## 2020-06-18 PROCEDURE — 99213 OFFICE O/P EST LOW 20 MIN: CPT | Mod: GC

## 2020-06-18 NOTE — HISTORY OF PRESENT ILLNESS
[FreeTextEntry1] : 43 Y/O F is here for the follow up. [de-identified] : 45 Y/O F with PMH of obesity, knee Osteoarthritis, scalp Schwannoma is here for the follow up.\par No active complains except knee pain.

## 2020-06-18 NOTE — PHYSICAL EXAM
[No Acute Distress] : no acute distress [Normal Sclera/Conjunctiva] : normal sclera/conjunctiva [Normal Outer Ear/Nose] : the outer ears and nose were normal in appearance [No JVD] : no jugular venous distention [Normal Rate] : normal rate  [No Respiratory Distress] : no respiratory distress  [No Carotid Bruits] : no carotid bruits [No CVA Tenderness] : no CVA  tenderness [Normal] : affect was normal and insight and judgment were intact [de-identified] : B/L Knee tenderness

## 2020-06-18 NOTE — ASSESSMENT
[FreeTextEntry1] : 45 Y/O F with PMH of obesity, OA of B/L Knee and Scalp Schwannoma is here for the follow up.\par \par B/L Knee Osteoarthritis\par -Strongly recommend weight loss\par -Confirmed on Xray from 2018\par -Tylenol PRN\par \par Obesity\par -Weight loss counselling given\par \par Scalp Schwannoma\par -S/P resection by surgery,\par again feels the bump\par -According to surgery notes it may recur\par -Surgery Follow up.\par \par HCM\par -Routine labs\par -Follow up in 6 months.

## 2020-07-01 DIAGNOSIS — R22.0 LOCALIZED SWELLING, MASS AND LUMP, HEAD: ICD-10-CM

## 2020-07-01 DIAGNOSIS — M17.0 BILATERAL PRIMARY OSTEOARTHRITIS OF KNEE: ICD-10-CM

## 2020-07-01 DIAGNOSIS — E66.9 OBESITY, UNSPECIFIED: ICD-10-CM

## 2020-07-09 LAB
ALBUMIN SERPL ELPH-MCNC: 4.3 G/DL
ALP BLD-CCNC: 93 U/L
ALT SERPL-CCNC: 42 U/L
ANION GAP SERPL CALC-SCNC: 13 MMOL/L
AST SERPL-CCNC: 28 U/L
BASOPHILS # BLD AUTO: 0.02 K/UL
BASOPHILS NFR BLD AUTO: 0.3 %
BILIRUB SERPL-MCNC: 0.3 MG/DL
BUN SERPL-MCNC: 7 MG/DL
CALCIUM SERPL-MCNC: 9.1 MG/DL
CHLORIDE SERPL-SCNC: 101 MMOL/L
CHOLEST SERPL-MCNC: 193 MG/DL
CHOLEST/HDLC SERPL: 3.7 RATIO
CO2 SERPL-SCNC: 25 MMOL/L
CREAT SERPL-MCNC: 0.6 MG/DL
EOSINOPHIL # BLD AUTO: 0.19 K/UL
EOSINOPHIL NFR BLD AUTO: 2.9 %
ESTIMATED AVERAGE GLUCOSE: 134 MG/DL
GLUCOSE SERPL-MCNC: 94 MG/DL
HBA1C MFR BLD HPLC: 6.3 %
HCT VFR BLD CALC: 39.4 %
HDLC SERPL-MCNC: 52 MG/DL
HGB BLD-MCNC: 11.8 G/DL
IMM GRANULOCYTES NFR BLD AUTO: 0.5 %
LDLC SERPL CALC-MCNC: 130 MG/DL
LYMPHOCYTES # BLD AUTO: 1.67 K/UL
LYMPHOCYTES NFR BLD AUTO: 25.5 %
MAN DIFF?: NORMAL
MCHC RBC-ENTMCNC: 25.6 PG
MCHC RBC-ENTMCNC: 29.9 G/DL
MCV RBC AUTO: 85.5 FL
MONOCYTES # BLD AUTO: 0.45 K/UL
MONOCYTES NFR BLD AUTO: 6.9 %
NEUTROPHILS # BLD AUTO: 4.2 K/UL
NEUTROPHILS NFR BLD AUTO: 63.9 %
PLATELET # BLD AUTO: 283 K/UL
POTASSIUM SERPL-SCNC: 4.4 MMOL/L
PROT SERPL-MCNC: 7 G/DL
RBC # BLD: 4.61 M/UL
RBC # FLD: 14.6 %
SODIUM SERPL-SCNC: 139 MMOL/L
TRIGL SERPL-MCNC: 93 MG/DL
TSH SERPL-ACNC: 2.22 UIU/ML
WBC # FLD AUTO: 6.56 K/UL

## 2020-07-15 ENCOUNTER — OUTPATIENT (OUTPATIENT)
Dept: OUTPATIENT SERVICES | Facility: HOSPITAL | Age: 45
LOS: 1 days | Discharge: HOME | End: 2020-07-15

## 2020-07-15 DIAGNOSIS — Z98.891 HISTORY OF UTERINE SCAR FROM PREVIOUS SURGERY: Chronic | ICD-10-CM

## 2020-07-15 DIAGNOSIS — R22.0 LOCALIZED SWELLING, MASS AND LUMP, HEAD: Chronic | ICD-10-CM

## 2020-08-07 ENCOUNTER — APPOINTMENT (OUTPATIENT)
Dept: SURGERY | Facility: CLINIC | Age: 45
End: 2020-08-07

## 2020-09-22 ENCOUNTER — APPOINTMENT (OUTPATIENT)
Dept: OBGYN | Facility: CLINIC | Age: 45
End: 2020-09-22
Payer: COMMERCIAL

## 2020-10-14 ENCOUNTER — OUTPATIENT (OUTPATIENT)
Dept: OUTPATIENT SERVICES | Facility: HOSPITAL | Age: 45
LOS: 1 days | Discharge: HOME | End: 2020-10-14

## 2020-10-14 DIAGNOSIS — R22.0 LOCALIZED SWELLING, MASS AND LUMP, HEAD: Chronic | ICD-10-CM

## 2020-10-14 DIAGNOSIS — Z98.891 HISTORY OF UTERINE SCAR FROM PREVIOUS SURGERY: Chronic | ICD-10-CM

## 2020-10-26 ENCOUNTER — APPOINTMENT (OUTPATIENT)
Dept: INTERNAL MEDICINE | Facility: CLINIC | Age: 45
End: 2020-10-26
Payer: COMMERCIAL

## 2020-10-26 ENCOUNTER — OUTPATIENT (OUTPATIENT)
Dept: OUTPATIENT SERVICES | Facility: HOSPITAL | Age: 45
LOS: 1 days | Discharge: HOME | End: 2020-10-26
Payer: OTHER GOVERNMENT

## 2020-10-26 ENCOUNTER — OUTPATIENT (OUTPATIENT)
Dept: OUTPATIENT SERVICES | Facility: HOSPITAL | Age: 45
LOS: 1 days | Discharge: HOME | End: 2020-10-26

## 2020-10-26 ENCOUNTER — RESULT REVIEW (OUTPATIENT)
Age: 45
End: 2020-10-26

## 2020-10-26 VITALS
DIASTOLIC BLOOD PRESSURE: 81 MMHG | TEMPERATURE: 98.8 F | WEIGHT: 198 LBS | SYSTOLIC BLOOD PRESSURE: 120 MMHG | HEIGHT: 57 IN | HEART RATE: 91 BPM | BODY MASS INDEX: 42.72 KG/M2

## 2020-10-26 DIAGNOSIS — R22.0 LOCALIZED SWELLING, MASS AND LUMP, HEAD: Chronic | ICD-10-CM

## 2020-10-26 DIAGNOSIS — Z12.31 ENCOUNTER FOR SCREENING MAMMOGRAM FOR MALIGNANT NEOPLASM OF BREAST: ICD-10-CM

## 2020-10-26 DIAGNOSIS — Z98.891 HISTORY OF UTERINE SCAR FROM PREVIOUS SURGERY: Chronic | ICD-10-CM

## 2020-10-26 DIAGNOSIS — M17.0 BILATERAL PRIMARY OSTEOARTHRITIS OF KNEE: ICD-10-CM

## 2020-10-26 PROCEDURE — 99213 OFFICE O/P EST LOW 20 MIN: CPT | Mod: GC

## 2020-10-26 PROCEDURE — 77067 SCR MAMMO BI INCL CAD: CPT | Mod: 26

## 2020-10-26 PROCEDURE — 77063 BREAST TOMOSYNTHESIS BI: CPT | Mod: 26

## 2020-10-26 NOTE — REVIEW OF SYSTEMS
[Negative] : Integumentary [Fever] : no fever [Chills] : no chills [Chest Pain] : no chest pain [Lower Ext Edema] : no lower extremity edema [Paroxysmal Nocturnal Dyspnea] : no paroxysmal nocturnal dyspnea [Shortness Of Breath] : no shortness of breath [Wheezing] : no wheezing [Cough] : no cough [Abdominal Pain] : no abdominal pain [FreeTextEntry9] : B/L knee pain. Right foot pain at the site of the wound

## 2020-10-26 NOTE — PHYSICAL EXAM
[No Acute Distress] : no acute distress [Normal Sclera/Conjunctiva] : normal sclera/conjunctiva [Normal Outer Ear/Nose] : the outer ears and nose were normal in appearance [No JVD] : no jugular venous distention [No Respiratory Distress] : no respiratory distress  [Normal Rate] : normal rate  [No Carotid Bruits] : no carotid bruits [No CVA Tenderness] : no CVA  tenderness [Normal] : affect was normal and insight and judgment were intact [de-identified] : B/L Knee tenderness [de-identified] : purulent wound on plantar aspect of the right foot.

## 2020-10-26 NOTE — ASSESSMENT
[FreeTextEntry1] : 43 Y/O F with PMH of obesity, OA of B/L Knee and Scalp Schwannoma is here for the follow up.\par \par #Purulent plantar right foot wound\par - no hx of  tetanus immunization\par - will receive TDAP\par - start keflex 500 mg q6 for seven days\par - might need incision and drainage\par - podiatry referral\par \par #Sleep apnea\par - will need sleep study\par - pulmonology referral\par .  \par #B/L Knee Osteoarthritis\par -Strongly recommend weight loss\par -Confirmed on Xray from 2018\par -Tylenol PRN\par \par #Dyslipidemia \par -10 year ascvd risk =0.9%\par - no indication for statin therapy\par - consulted on life style modification\par \par #prediabetic \par -hba1c = 6.3%\par - lifestyle modification for now. explained to patient she might need to be started on metformin. \par \par Obesity\par -Weight loss counselling given\par - dietician referral\par \par Scalp Schwannoma\par -S/P resection by surgery,\par again feels the bump\par \par \par HCM\par - Routine labs\par - flu vaccine today\par - TDAP vaccine today \par - obgyn referral- pap smear\par - needs mammography \par - Follow up in 6 months. and prn

## 2020-10-26 NOTE — HISTORY OF PRESENT ILLNESS
[FreeTextEntry1] : 43 Y/O F is here for the follow up. [de-identified] : 45 Y/O F with PMH of obesity, knee Osteoarthritis, scalp Schwannoma is here for the follow up.\par Patient states that she stepped on a metal object inside her home last Thursday. Patient states that ever since her foot has been hurting her. Patient has been treating her wound with neosporin at home topically. her tetanus immunization is unknown.  Patient does not endorse any fevers, spastic muscles or sob , loss of feeling in her feet. \par \par Another issue is that the patient is also having sleeping in the middle of the day. she gets tired toward the noon time and feels like she needs a nap.

## 2020-10-29 DIAGNOSIS — Z00.00 ENCOUNTER FOR GENERAL ADULT MEDICAL EXAMINATION WITHOUT ABNORMAL FINDINGS: ICD-10-CM

## 2020-10-29 DIAGNOSIS — E66.9 OBESITY, UNSPECIFIED: ICD-10-CM

## 2020-10-29 DIAGNOSIS — E78.5 HYPERLIPIDEMIA, UNSPECIFIED: ICD-10-CM

## 2020-10-29 DIAGNOSIS — M17.0 BILATERAL PRIMARY OSTEOARTHRITIS OF KNEE: ICD-10-CM

## 2020-10-29 DIAGNOSIS — R22.0 LOCALIZED SWELLING, MASS AND LUMP, HEAD: ICD-10-CM

## 2020-10-30 ENCOUNTER — APPOINTMENT (OUTPATIENT)
Dept: PODIATRY | Facility: CLINIC | Age: 45
End: 2020-10-30
Payer: COMMERCIAL

## 2020-10-30 ENCOUNTER — OUTPATIENT (OUTPATIENT)
Dept: OUTPATIENT SERVICES | Facility: HOSPITAL | Age: 45
LOS: 1 days | Discharge: HOME | End: 2020-10-30

## 2020-10-30 DIAGNOSIS — M79.672 PAIN IN LEFT FOOT: ICD-10-CM

## 2020-10-30 DIAGNOSIS — Z98.891 HISTORY OF UTERINE SCAR FROM PREVIOUS SURGERY: Chronic | ICD-10-CM

## 2020-10-30 DIAGNOSIS — R22.0 LOCALIZED SWELLING, MASS AND LUMP, HEAD: Chronic | ICD-10-CM

## 2020-10-30 PROCEDURE — 12001 RPR S/N/AX/GEN/TRNK 2.5CM/<: CPT

## 2020-10-30 PROCEDURE — 99203 OFFICE O/P NEW LOW 30 MIN: CPT | Mod: 25

## 2020-11-03 ENCOUNTER — OUTPATIENT (OUTPATIENT)
Dept: OUTPATIENT SERVICES | Facility: HOSPITAL | Age: 45
LOS: 1 days | Discharge: HOME | End: 2020-11-03
Payer: SUBSIDIZED

## 2020-11-03 DIAGNOSIS — M79.672 PAIN IN LEFT FOOT: ICD-10-CM

## 2020-11-03 DIAGNOSIS — R22.0 LOCALIZED SWELLING, MASS AND LUMP, HEAD: Chronic | ICD-10-CM

## 2020-11-03 DIAGNOSIS — Z98.891 HISTORY OF UTERINE SCAR FROM PREVIOUS SURGERY: Chronic | ICD-10-CM

## 2020-11-03 PROCEDURE — 73630 X-RAY EXAM OF FOOT: CPT | Mod: 26,LT

## 2020-11-06 ENCOUNTER — APPOINTMENT (OUTPATIENT)
Dept: PODIATRY | Facility: CLINIC | Age: 45
End: 2020-11-06
Payer: COMMERCIAL

## 2020-11-06 ENCOUNTER — OUTPATIENT (OUTPATIENT)
Dept: OUTPATIENT SERVICES | Facility: HOSPITAL | Age: 45
LOS: 1 days | Discharge: HOME | End: 2020-11-06

## 2020-11-06 DIAGNOSIS — R22.0 LOCALIZED SWELLING, MASS AND LUMP, HEAD: Chronic | ICD-10-CM

## 2020-11-06 DIAGNOSIS — Z98.891 HISTORY OF UTERINE SCAR FROM PREVIOUS SURGERY: Chronic | ICD-10-CM

## 2020-11-06 PROCEDURE — 99213 OFFICE O/P EST LOW 20 MIN: CPT

## 2020-11-06 NOTE — HISTORY OF PRESENT ILLNESS
[FreeTextEntry1] : A 44 y/o female return to clinic for laceration left plantar foot  on 10/22/2020.\par .

## 2020-11-06 NOTE — PHYSICAL EXAM
[FreeTextEntry1] : suture intact left plantar wound\par no erythema/edema noted. mild pain on palpation.

## 2020-11-06 NOTE — ASSESSMENT
[FreeTextEntry1] : Patient examined, history and chart reviewed\par wound flushed and inspected\par LAceration closed with 3.0 nylon \par followup in 1 week

## 2020-11-12 ENCOUNTER — APPOINTMENT (OUTPATIENT)
Dept: INTERNAL MEDICINE | Facility: CLINIC | Age: 45
End: 2020-11-12
Payer: COMMERCIAL

## 2020-11-12 ENCOUNTER — OUTPATIENT (OUTPATIENT)
Dept: OUTPATIENT SERVICES | Facility: HOSPITAL | Age: 45
LOS: 1 days | Discharge: HOME | End: 2020-11-12

## 2020-11-12 ENCOUNTER — APPOINTMENT (OUTPATIENT)
Dept: PODIATRY | Facility: CLINIC | Age: 45
End: 2020-11-12
Payer: COMMERCIAL

## 2020-11-12 VITALS
DIASTOLIC BLOOD PRESSURE: 84 MMHG | HEIGHT: 57 IN | TEMPERATURE: 97.5 F | HEART RATE: 82 BPM | WEIGHT: 196 LBS | BODY MASS INDEX: 42.28 KG/M2 | SYSTOLIC BLOOD PRESSURE: 128 MMHG

## 2020-11-12 DIAGNOSIS — E66.9 OBESITY, UNSPECIFIED: ICD-10-CM

## 2020-11-12 DIAGNOSIS — R73.03 PREDIABETES: ICD-10-CM

## 2020-11-12 DIAGNOSIS — R06.83 SNORING: ICD-10-CM

## 2020-11-12 DIAGNOSIS — Z98.891 HISTORY OF UTERINE SCAR FROM PREVIOUS SURGERY: Chronic | ICD-10-CM

## 2020-11-12 DIAGNOSIS — S91.311A LACERATION WITHOUT FOREIGN BODY, RIGHT FOOT, INITIAL ENCOUNTER: ICD-10-CM

## 2020-11-12 DIAGNOSIS — R22.0 LOCALIZED SWELLING, MASS AND LUMP, HEAD: Chronic | ICD-10-CM

## 2020-11-12 DIAGNOSIS — D64.9 ANEMIA, UNSPECIFIED: ICD-10-CM

## 2020-11-12 DIAGNOSIS — S91.311A LACERATION W/OUT FOREIGN BODY, RIGHT FOOT, INITIAL ENCOUNTER: ICD-10-CM

## 2020-11-12 DIAGNOSIS — R22.0 LOCALIZED SWELLING, MASS AND LUMP, HEAD: ICD-10-CM

## 2020-11-12 DIAGNOSIS — M79.672 PAIN IN LEFT FOOT: ICD-10-CM

## 2020-11-12 DIAGNOSIS — M86.9 OSTEOMYELITIS, UNSPECIFIED: ICD-10-CM

## 2020-11-12 PROCEDURE — 99214 OFFICE O/P EST MOD 30 MIN: CPT | Mod: GC

## 2020-11-12 PROCEDURE — 99213 OFFICE O/P EST LOW 20 MIN: CPT

## 2020-11-12 NOTE — REVIEW OF SYSTEMS
[Negative] : Heme/Lymph [Fever] : no fever [Chills] : no chills [Fatigue] : no fatigue [Hot Flashes] : no hot flashes [Night Sweats] : no night sweats [Recent Change In Weight] : ~T no recent weight change [Discharge] : no discharge [Pain] : no pain [Redness] : no redness [Dryness] : no dryness  [Vision Problems] : no vision problems [Itching] : no itching [Earache] : no earache [Hearing Loss] : no hearing loss [Nosebleed] : no nosebleeds [Hoarseness] : no hoarseness [Nasal Discharge] : no nasal discharge [Sore Throat] : no sore throat [Postnasal Drip] : no postnasal drip [Chest Pain] : no chest pain [Orthopnea] : no orthopnea [Shortness Of Breath] : no shortness of breath [Abdominal Pain] : no abdominal pain [Nausea] : no nausea [Constipation] : no constipation [Diarrhea] : diarrhea [Vomiting] : no vomiting [Heartburn] : no heartburn [Melena] : no melena [Dysuria] : no dysuria [Incontinence] : no incontinence [Hematuria] : no hematuria [Frequency] : no frequency [Joint Pain] : no joint pain [Joint Stiffness] : no joint stiffness [Muscle Pain] : no muscle pain [Back Pain] : no back pain [Headache] : no headache [Dizziness] : no dizziness [Memory Loss] : no memory loss [Unsteady Walking] : no ataxia [Suicidal] : not suicidal [Insomnia] : no insomnia [Anxiety] : no anxiety [Depression] : no depression [Easy Bleeding] : no easy bleeding [Easy Bruising] : no easy bruising [Swollen Glands] : no swollen glands [FreeTextEntry9] : Check HPI ( left foot laceration)

## 2020-11-12 NOTE — ASSESSMENT
[FreeTextEntry1] : 45-year-old, Female patient,  with PMH of obesity, Pre-diabetes, knee Osteoarthritis, scalp Schwannoma is here for the follow up. The patient had a laceration of her left foot when she hit the mirror/glass at her house. At the moment the patient bled, but sought medical advice after 2-3 days when she noted a purulent discharge. She was seen by Dr. Quintana who referred her to podiatry and gave her a Tdap vaccine. The patient got Xrays done of her left, received a course of antibiotics and was given pain meds. Xrays where concerning of a possible underlying osteomyelitis. \par \par # Laceration of the left foot: \par Assessed by Podiatry after referral from Dr. Quintana, Podiatry did the following: \par - suture intact a sper podiatry\par - applied adaptic dsd kerlix ace\par - recommended  cont darco\par - rtc in 1 week for suture removal \par - Completed a course of antibiotics ( Keflex given by Dr. Quintana, prescribed Bactrim by Podiatry team for a total of 14 days) \par - Took pain medications \par - Follow up with podiatry was supposed to be Friday but was postponed for a week\par - Xray of the left foot: \par a. Non-specific cortical erosions at the 3rd mid metatarsal joint, may represent  artifact or may be concerning of underlying osteomyelitis, recommends MRI  \par b. Non-specific 5th mid metatarsal lucency\par - MRI ordered by podiatry and will be done \par - Neurovascvular exam of the foot is limited due to presence of guaze, but intact sensation, normal motion of toes, no change in color of skin or toes. \par \par #Prediabetes: \par - Last HbA1c was 6.3% \par - Patient was advised to eat healthy, avoid carbohydrate rich foods including Pasta, bread, potatoes, corn and sodas. \par - Can not exercise at the moment due to the underlying left foot laceration condition with possible osteomyelitis. \par \par #Morbidly obese: \par - BMI: 42 \par - Diet and exercise highly encouraged. \par - Patient was advised to eat healthy, avoid carbohydrate rich foods including Pasta, bread, potatoes, corn and sodas. \par - Can not exercise at the moment due to the underlying left foot laceration condition with possible osteomyelitis\par \par #Anemia: \par - Mild, Hb: 11.8, Normocytic with High RDW\par - Might need a repeat CBCD, if persistent with anemia --> iron studies required\par \par #Scalp Schwannomas: \par - Patient reports having multiple of these, only one actively present now, other two were surgically resected.\par - Growing in size as per patient, painless lesion, with no tension headache. \par - soft on physical exam, mobile ( not fixed to the neck or scalp). \par - No palpable adenopathies on physical exam ( Anterior cervical, Posterior cervical and auricular nodes all examined, nothing palpable)\par \par #Snoring: \par - Most likely PAMELA \par - Refferal to pulmonology referral\par - If the patient fails to lose weight and improve her BMI, may require bariatric surgery referral\par \par #HCM:\par - Received Flu Shot and Tdap vaccines on October 2020 \par - Non-smoker, pre-diabetic --> No  indication for PPSV23 at the moment \par - Mammogram done October 2020  --> Normal \par - No Family history of any malignancy as per patient --> Colonoscopy to screen for CRC can be started at the age of 50\par - Pap smear: Referred to ObGyn and has an appointment in December.

## 2020-11-12 NOTE — PLAN
[FreeTextEntry1] : 1) MRI of the foot ( Ordered by podiatry, assigned on Wednesday), R/o osteomyelitis\par 2) Follow up with podiatry \par 3) Follow up with Gynecology ( Appointment in December) \par 4) Sonogram of the scalp \par 5) General Surgery referral\par 6) Repeat CBCD with Ferritin and HbA1C before next visit.

## 2020-11-12 NOTE — HISTORY OF PRESENT ILLNESS
[FreeTextEntry1] : Follow up [de-identified] : 45-year-old, Female patient,  with PMH of obesity, Pre-diabetes, knee Osteoarthritis, scalp Schwannoma is here for the follow up. The patient had a laceration of her left foot when she hit the mirror/glass at her house. At the moment the patient bled, but sought medical advice after 2-3 days when she noted a purulent discharge. She was seen by Dr. Quintana who referred her to podiatry and gave her a Tdap vaccine. The patient got Xrays done of her left, received a course of antibiotics and was given pain meds. Xrays where concerning of a possible underlying osteomyelitis.

## 2020-11-12 NOTE — PHYSICAL EXAM
[No JVD] : no jugular venous distention [Mass (___cm)] : had a ~M  ~Vcm mass [No Respiratory Distress] : no respiratory distress  [No Accessory Muscle Use] : no accessory muscle use [Clear to Auscultation] : lungs were clear to auscultation bilaterally [Normal Rate] : normal rate  [Regular Rhythm] : with a regular rhythm [Normal S1, S2] : normal S1 and S2 [No Murmur] : no murmur heard [No Edema] : there was no peripheral edema [Soft] : abdomen soft [Non Tender] : non-tender [Non-distended] : non-distended [No HSM] : no HSM [Normal Bowel Sounds] : normal bowel sounds [Obese] : obese [Normal Posterior Cervical Nodes] : no posterior cervical lymphadenopathy [Normal Anterior Cervical Nodes] : no anterior cervical lymphadenopathy [No Rash] : no rash [Coordination Grossly Intact] : coordination grossly intact [No Focal Deficits] : no focal deficits [Normal Gait] : normal gait [Speech Grossly Normal] : speech grossly normal [Memory Grossly Normal] : memory grossly normal [Normal Affect] : the affect was normal [Normal Mood] : the mood was normal [Normal] : affect was normal and insight and judgment were intact [de-identified] : The patient has a left sided soft, mobile mass in the scalp.Check HPI.  [de-identified] : Obese

## 2020-11-16 PROBLEM — M86.9 OSTEOMYELITIS OF FOOT: Status: ACTIVE | Noted: 2020-11-06

## 2020-11-16 NOTE — HISTORY OF PRESENT ILLNESS
[FreeTextEntry1] : Laceration left plantar foot  on 10/22/2020.\par Sutured in office\par Finished Meds\par Mild pain with ambulation\par She hasn't changed the dressing. since last office in clinic\par

## 2020-11-16 NOTE — END OF VISIT
[] : Resident [Resident] : Resident [FreeTextEntry3] : agree with above note.  \par Was present and instructing resident during visit. \par All Procedure performed under direct supervision.  \par LORE Johnson DPM\par  [FreeTextEntry2] : agree with above note.  \par Was present and instructing resident during visit. \par All Procedure performed under direct supervision.  \par LORE Johnson DPM\par

## 2020-11-16 NOTE — ASSESSMENT
[FreeTextEntry1] : laceration plantar midfoot left\par suture Removed\par Neosporin/Bandage - cont daily \par cont darco\par Xray concerning for OM - Rx MRI to R/o OM\par rtc in 2 week with MRI results

## 2020-11-16 NOTE — PHYSICAL EXAM
[2+] : left foot dorsalis pedis 2+ [FreeTextEntry1] : suture intact left plantar wound\par no erythema/edema noted. mild pain on palpation. \par Dry skin

## 2020-11-17 DIAGNOSIS — M79.672 PAIN IN LEFT FOOT: ICD-10-CM

## 2020-11-17 DIAGNOSIS — M86.9 OSTEOMYELITIS, UNSPECIFIED: ICD-10-CM

## 2020-11-17 DIAGNOSIS — S91.312A LACERATION WITHOUT FOREIGN BODY, LEFT FOOT, INITIAL ENCOUNTER: ICD-10-CM

## 2020-11-18 ENCOUNTER — APPOINTMENT (OUTPATIENT)
Dept: PODIATRY | Facility: CLINIC | Age: 45
End: 2020-11-18

## 2020-11-24 ENCOUNTER — OUTPATIENT (OUTPATIENT)
Dept: OUTPATIENT SERVICES | Facility: HOSPITAL | Age: 45
LOS: 1 days | End: 2020-11-24

## 2020-11-24 DIAGNOSIS — R22.0 LOCALIZED SWELLING, MASS AND LUMP, HEAD: Chronic | ICD-10-CM

## 2020-11-24 DIAGNOSIS — Z98.891 HISTORY OF UTERINE SCAR FROM PREVIOUS SURGERY: Chronic | ICD-10-CM

## 2020-11-24 DIAGNOSIS — K02.9 DENTAL CARIES, UNSPECIFIED: ICD-10-CM

## 2020-11-27 NOTE — ASSESSMENT
[FreeTextEntry1] : Patient examined, history and chart reviewed\par sutures intact\par wound appears clean\par no signs of ifection\par

## 2020-11-27 NOTE — HISTORY OF PRESENT ILLNESS
[FreeTextEntry1] : A 46 y/o female return to clinic for laceration left plantar foot  on 10/22/2020.\par .

## 2020-11-27 NOTE — END OF VISIT
[FreeTextEntry3] : agree with above note.  \par Was present and instructing resident during visit. \par All Procedure performed under direct supervision.  \par LORE Johnson DPM\par  [FreeTextEntry2] : agree with above note.  \par Was present and instructing resident during visit. \par All Procedure performed under direct supervision.  \par LORE Johnson DPM\par  [] : Resident [Resident] : Resident

## 2020-11-27 NOTE — PHYSICAL EXAM
[2+] : left foot dorsalis pedis 2+ [FreeTextEntry1] : suture intact left plantar wound\par no erythema/edema noted. mild pain on palpation.

## 2020-11-29 ENCOUNTER — OUTPATIENT (OUTPATIENT)
Dept: OUTPATIENT SERVICES | Facility: HOSPITAL | Age: 45
LOS: 1 days | Discharge: HOME | End: 2020-11-29
Payer: SUBSIDIZED

## 2020-11-29 ENCOUNTER — RESULT REVIEW (OUTPATIENT)
Age: 45
End: 2020-11-29

## 2020-11-29 DIAGNOSIS — Z98.891 HISTORY OF UTERINE SCAR FROM PREVIOUS SURGERY: Chronic | ICD-10-CM

## 2020-11-29 DIAGNOSIS — R22.0 LOCALIZED SWELLING, MASS AND LUMP, HEAD: Chronic | ICD-10-CM

## 2020-11-29 DIAGNOSIS — M86.9 OSTEOMYELITIS, UNSPECIFIED: ICD-10-CM

## 2020-11-29 PROCEDURE — 73720 MRI LWR EXTREMITY W/O&W/DYE: CPT | Mod: 26,LT

## 2020-12-03 ENCOUNTER — OUTPATIENT (OUTPATIENT)
Dept: OUTPATIENT SERVICES | Facility: HOSPITAL | Age: 45
LOS: 1 days | Discharge: HOME | End: 2020-12-03

## 2020-12-03 ENCOUNTER — INPATIENT (INPATIENT)
Facility: HOSPITAL | Age: 45
LOS: 1 days | Discharge: HOME | End: 2020-12-05
Attending: INTERNAL MEDICINE | Admitting: INTERNAL MEDICINE
Payer: MEDICAID

## 2020-12-03 ENCOUNTER — APPOINTMENT (OUTPATIENT)
Dept: PODIATRY | Facility: CLINIC | Age: 45
End: 2020-12-03
Payer: MEDICAID

## 2020-12-03 VITALS
DIASTOLIC BLOOD PRESSURE: 66 MMHG | WEIGHT: 197.98 LBS | HEART RATE: 79 BPM | TEMPERATURE: 98 F | SYSTOLIC BLOOD PRESSURE: 138 MMHG | HEIGHT: 62 IN | OXYGEN SATURATION: 99 % | RESPIRATION RATE: 18 BRPM
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
## 2020-12-03 DIAGNOSIS — S91.322A LACERATION WITH FOREIGN BODY, LEFT FOOT, INITIAL ENCOUNTER: ICD-10-CM
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
## 2020-12-03 DIAGNOSIS — Z98.891 HISTORY OF UTERINE SCAR FROM PREVIOUS SURGERY: Chronic | ICD-10-CM

## 2020-12-03 DIAGNOSIS — K58.9 IRRITABLE BOWEL SYNDROME WITHOUT DIARRHEA: ICD-10-CM

## 2020-12-03 DIAGNOSIS — L03.116 CELLULITIS OF LEFT LOWER LIMB: ICD-10-CM

## 2020-12-03 DIAGNOSIS — R22.0 LOCALIZED SWELLING, MASS AND LUMP, HEAD: Chronic | ICD-10-CM

## 2020-12-03 DIAGNOSIS — E66.9 OBESITY, UNSPECIFIED: ICD-10-CM

## 2020-12-03 DIAGNOSIS — R73.03 PREDIABETES: ICD-10-CM

## 2020-12-03 LAB
ALBUMIN SERPL ELPH-MCNC: 4.2 G/DL — SIGNIFICANT CHANGE UP (ref 3.5–5.2)
ALP SERPL-CCNC: 83 U/L — SIGNIFICANT CHANGE UP (ref 30–115)
ALT FLD-CCNC: 32 U/L — SIGNIFICANT CHANGE UP (ref 0–41)
ANION GAP SERPL CALC-SCNC: 12 MMOL/L — SIGNIFICANT CHANGE UP (ref 7–14)
APTT BLD: 30.2 SEC — SIGNIFICANT CHANGE UP (ref 27–39.2)
AST SERPL-CCNC: 26 U/L — SIGNIFICANT CHANGE UP (ref 0–41)
BASOPHILS # BLD AUTO: 0.03 K/UL — SIGNIFICANT CHANGE UP (ref 0–0.2)
BASOPHILS NFR BLD AUTO: 0.4 % — SIGNIFICANT CHANGE UP (ref 0–1)
BILIRUB SERPL-MCNC: 0.3 MG/DL — SIGNIFICANT CHANGE UP (ref 0.2–1.2)
BUN SERPL-MCNC: 8 MG/DL — LOW (ref 10–20)
CALCIUM SERPL-MCNC: 8.4 MG/DL — LOW (ref 8.5–10.1)
CHLORIDE SERPL-SCNC: 105 MMOL/L — SIGNIFICANT CHANGE UP (ref 98–110)
CO2 SERPL-SCNC: 21 MMOL/L — SIGNIFICANT CHANGE UP (ref 17–32)
CREAT SERPL-MCNC: 0.6 MG/DL — LOW (ref 0.7–1.5)
EOSINOPHIL # BLD AUTO: 0.3 K/UL — SIGNIFICANT CHANGE UP (ref 0–0.7)
EOSINOPHIL NFR BLD AUTO: 3.8 % — SIGNIFICANT CHANGE UP (ref 0–8)
GLUCOSE SERPL-MCNC: 87 MG/DL — SIGNIFICANT CHANGE UP (ref 70–99)
HCT VFR BLD CALC: 37.1 % — SIGNIFICANT CHANGE UP (ref 37–47)
HGB BLD-MCNC: 11.7 G/DL — LOW (ref 12–16)
IMM GRANULOCYTES NFR BLD AUTO: 0.4 % — HIGH (ref 0.1–0.3)
INR BLD: 1.05 RATIO — SIGNIFICANT CHANGE UP (ref 0.65–1.3)
LYMPHOCYTES # BLD AUTO: 1.78 K/UL — SIGNIFICANT CHANGE UP (ref 1.2–3.4)
LYMPHOCYTES # BLD AUTO: 22.6 % — SIGNIFICANT CHANGE UP (ref 20.5–51.1)
MAGNESIUM SERPL-MCNC: 2.1 MG/DL — SIGNIFICANT CHANGE UP (ref 1.8–2.4)
MCHC RBC-ENTMCNC: 25.7 PG — LOW (ref 27–31)
MCHC RBC-ENTMCNC: 31.5 G/DL — LOW (ref 32–37)
MCV RBC AUTO: 81.4 FL — SIGNIFICANT CHANGE UP (ref 81–99)
MONOCYTES # BLD AUTO: 0.52 K/UL — SIGNIFICANT CHANGE UP (ref 0.1–0.6)
MONOCYTES NFR BLD AUTO: 6.6 % — SIGNIFICANT CHANGE UP (ref 1.7–9.3)
NEUTROPHILS # BLD AUTO: 5.21 K/UL — SIGNIFICANT CHANGE UP (ref 1.4–6.5)
NEUTROPHILS NFR BLD AUTO: 66.2 % — SIGNIFICANT CHANGE UP (ref 42.2–75.2)
NRBC # BLD: 0 /100 WBCS — SIGNIFICANT CHANGE UP (ref 0–0)
PHOSPHATE SERPL-MCNC: 3.7 MG/DL — SIGNIFICANT CHANGE UP (ref 2.1–4.9)
PLATELET # BLD AUTO: 244 K/UL — SIGNIFICANT CHANGE UP (ref 130–400)
POTASSIUM SERPL-MCNC: 4.1 MMOL/L — SIGNIFICANT CHANGE UP (ref 3.5–5)
POTASSIUM SERPL-SCNC: 4.1 MMOL/L — SIGNIFICANT CHANGE UP (ref 3.5–5)
PROT SERPL-MCNC: 6.9 G/DL — SIGNIFICANT CHANGE UP (ref 6–8)
PROTHROM AB SERPL-ACNC: 12.1 SEC — SIGNIFICANT CHANGE UP (ref 9.95–12.87)
RBC # BLD: 4.56 M/UL — SIGNIFICANT CHANGE UP (ref 4.2–5.4)
RBC # FLD: 14.1 % — SIGNIFICANT CHANGE UP (ref 11.5–14.5)
SARS-COV-2 RNA SPEC QL NAA+PROBE: SIGNIFICANT CHANGE UP
SODIUM SERPL-SCNC: 138 MMOL/L — SIGNIFICANT CHANGE UP (ref 135–146)
WBC # BLD: 7.87 K/UL — SIGNIFICANT CHANGE UP (ref 4.8–10.8)
WBC # FLD AUTO: 7.87 K/UL — SIGNIFICANT CHANGE UP (ref 4.8–10.8)

## 2020-12-03 PROCEDURE — 73630 X-RAY EXAM OF FOOT: CPT | Mod: 26,LT

## 2020-12-03 PROCEDURE — 71045 X-RAY EXAM CHEST 1 VIEW: CPT | Mod: 26

## 2020-12-03 PROCEDURE — 93010 ELECTROCARDIOGRAM REPORT: CPT

## 2020-12-03 PROCEDURE — 99213 OFFICE O/P EST LOW 20 MIN: CPT

## 2020-12-03 PROCEDURE — 99222 1ST HOSP IP/OBS MODERATE 55: CPT

## 2020-12-03 PROCEDURE — 99285 EMERGENCY DEPT VISIT HI MDM: CPT

## 2020-12-03 RX ORDER — ENOXAPARIN SODIUM 100 MG/ML
40 INJECTION SUBCUTANEOUS EVERY 12 HOURS
Refills: 0 | Status: DISCONTINUED | OUTPATIENT
Start: 2020-12-03 | End: 2020-12-03

## 2020-12-03 RX ORDER — CEFAZOLIN SODIUM 1 G
VIAL (EA) INJECTION
Refills: 0 | Status: DISCONTINUED | OUTPATIENT
Start: 2020-12-03 | End: 2020-12-04

## 2020-12-03 RX ORDER — CEFAZOLIN SODIUM 1 G
1000 VIAL (EA) INJECTION ONCE
Refills: 0 | Status: COMPLETED | OUTPATIENT
Start: 2020-12-03 | End: 2020-12-03

## 2020-12-03 RX ORDER — ENOXAPARIN SODIUM 100 MG/ML
40 INJECTION SUBCUTANEOUS DAILY
Refills: 0 | Status: DISCONTINUED | OUTPATIENT
Start: 2020-12-03 | End: 2020-12-04

## 2020-12-03 RX ORDER — CEFAZOLIN SODIUM 1 G
1000 VIAL (EA) INJECTION EVERY 8 HOURS
Refills: 0 | Status: DISCONTINUED | OUTPATIENT
Start: 2020-12-04 | End: 2020-12-04

## 2020-12-03 RX ORDER — CHLORHEXIDINE GLUCONATE 213 G/1000ML
1 SOLUTION TOPICAL
Refills: 0 | Status: DISCONTINUED | OUTPATIENT
Start: 2020-12-03 | End: 2020-12-04

## 2020-12-03 RX ORDER — PANTOPRAZOLE SODIUM 20 MG/1
40 TABLET, DELAYED RELEASE ORAL
Refills: 0 | Status: DISCONTINUED | OUTPATIENT
Start: 2020-12-03 | End: 2020-12-04

## 2020-12-03 RX ADMIN — Medication 100 MILLIGRAM(S): at 22:25

## 2020-12-03 NOTE — ED PROVIDER NOTE - NS ED ROS FT

## 2020-12-03 NOTE — H&P ADULT - HISTORY OF PRESENT ILLNESS
45F with PMH of HLD (controlled w/diet), prediabetes presented with sharp persistent 8/10 Left Plantar Midfoot pain that has been worsening since last 1 month. She slipped while stepping outside her house and had a metal piece penetrate the L foot about a month ago. She received tetanus shot at that time and was advised to see podiatry Dr. Johnson who requested an MRI. Results were conveyed to patient today and was advised to got o ER for abx. MRI w/o cont L foot showed partial tear of plantar fascia central cord, cellulitis without abscess or osteomyelitis and suspected metallic artifact within the plantar midfoot skin.   Denies F/N/V and shortness of breath    In the ED pt was afebrile, normotensive with normal HR and RR. Labs were unremarkable  Admitted to med/surg floor for cellulitis treatment   45F with PMH of HLD (controlled w/diet), prediabetes presented with sharp persistent 8/10 Left Plantar Midfoot pain that has been worsening since last 1 month. She slipped while stepping outside her house and had a metal piece penetrate the L foot about a month ago. She received tetanus shot at that time and was advised to see podiatry Dr. Johnson who requested an MRI. Results were conveyed to patient today and was advised to got o ER for abx. MRI w/o cont L foot showed partial tear of plantar fascia central cord, cellulitis without abscess or osteomyelitis and suspected metallic artifact within the plantar midfoot skin.   Denies F/N/V and shortness of breath    In the ED pt was afebrile, normotensive with normal HR and RR. Labs were unremarkable  Admitted to med/surg floor for cellulitis treatment    SUBJECTIVE:  Patient is a 45y old Female who presents with a chief complaint of L foot pain (03 Dec 2020 21:29)   Currently admitted to medicine with the primary diagnosis of Foreign body in foot     Today is hospital day . There are no new issues or overnight events.     PAST MEDICAL & SURGICAL HISTORY  IBS (irritable bowel syndrome)    S/P     Scalp mass  removal of scalp mass     SOCIAL HISTORY:  Negative for smoking/alcohol/drug use.     ALLERGIES:  No Known Allergies    MEDICATIONS:  HOME MEDICATIONS    STANDING MEDICATIONS  chlorhexidine 4% Liquid 1 Application(s) Topical <User Schedule>  pantoprazole    Tablet 40 milliGRAM(s) Oral before breakfast    PRN MEDICATIONS    VITALS:   ICU Vital Signs Last 24 Hrs  T(C): 36.4 (03 Dec 2020 14:20), Max: 36.4 (03 Dec 2020 14:20)  T(F): 97.6 (03 Dec 2020 14:20), Max: 97.6 (03 Dec 2020 14:20)  HR: 79 (03 Dec 2020 14:20) (79 - 79)  BP: 138/66 (03 Dec 2020 14:20) (138/66 - 138/66)  BP(mean): --  ABP: --  ABP(mean): --  RR: 18 (03 Dec 2020 14:20) (18 - 18)  SpO2: 99% (03 Dec 2020 14:20) (99% - 99%)    LABS:                        11.7   7.87  )-----------( 244      ( 03 Dec 2020 15:25 )             37.1     12-03    138  |  105  |  8<L>  ----------------------------<  87  4.1   |  21  |  0.6<L>    Ca    8.4<L>      03 Dec 2020 15:25  Phos  3.7       Mg     2.1         TPro  6.9  /  Alb  4.2  /  TBili  0.3  /  DBili  x   /  AST  26  /  ALT  32  /  AlkPhos  83  12    PT/INR - ( 03 Dec 2020 15:25 )   PT: 12.10 sec;   INR: 1.05 ratio         PTT - ( 03 Dec 2020 15:25 )  PTT:30.2 sec    I&O's Detail    PHYSICAL EXAM:  GEN: No acute distress  HEENT: Normocephalic  NECK: Supple  LUNGS: Decreased breathe sounds  HEART: Regular  ABD: Soft, non-tender, non-distended.  EXT: L plantar midfoot tenderness, warm to touch  NEURO: AAOX3  PSYCH: Appropriate mood, responds appropriately

## 2020-12-03 NOTE — ED ADULT NURSE NOTE - NSIMPLEMENTINTERV_GEN_ALL_ED
Implemented All Universal Safety Interventions:  [place] to call system. Call bell, personal items and telephone within reach. Instruct patient to call for assistance. Room bathroom lighting operational. Non-slip footwear when patient is off stretcher. Physically safe environment: no spills, clutter or unnecessary equipment. Stretcher in lowest position, wheels locked, appropriate side rails in place.

## 2020-12-03 NOTE — ED PROVIDER NOTE - OBJECTIVE STATEMENT
46 y/o F  PMH of HLD p/w sharp persistent 8/10 Left Plantar Midfoot pain that has been worsening since last 1 month. She slipped while stepping outside her house and had a metal piece penetrate the L foot about a month ago. She received tetanus shot at that time and was advised to see podiatry Dr. Johnson who requested an MRI. Came to ED today as per Dr. Johnson for wound exploration and possible MRI.

## 2020-12-03 NOTE — ED ADULT NURSE NOTE - OBJECTIVE STATEMENT
Patient c/o left foot pain after stepping on piece of metal. On assessment scab present on left foot- foreign body in place. No redness or swelling noted around area. Denies nausea/vomiting/fever/chills/SOB.

## 2020-12-03 NOTE — CONSULT NOTE ADULT - SUBJECTIVE AND OBJECTIVE BOX
Podiatry Consult Note    Subjective:  GIOVANNY OTERO is a pleasant well-nourished, well developed 45y Female in no acute distress, alert awake, and oriented to person, place and time.   Patient is a 45y old  Female who presents with a chief complaint of Foreign Body Left Plantar Midfoot;   Patient was seen in the Foot Clinic w/ Dr. Johnson; Was Subsequently sent into the ED for foreign Body removal; Currently Patient denies F/N/V and shortness of breath, but says the pain on the left foot  has been persistent without resolve;     Past Medical History and Surgical History  IBS (irritable bowel syndrome)    S/P   Scalp mass  removal of scalp mass     Review of Systems:   Constitutional: No weakness, fevers or chills  Eyes / ENT: No visual changes; No vertigo or throat pain   Neck: No pain or stiffness  Respiratory: No cough, wheezing, hemoptysis; No shortness of breath  Cardiovascular: No chest pain or palpitations  Gastrointestinal: No abdominal or epigastric pain. No nausea, vomiting, or hematemesis; No diarrhea or constipation. No melena or hematochezia.  Genitourinary: No dysuria, frequency or hematuria  Neurological: No numbness or weakness  Skin: Bulge w/ Central Core noted on the Plantar Midfoot; Erythematous     Objective:  Vital Signs Last 24 Hrs  T(C): 36.4 (03 Dec 2020 14:20), Max: 36.4 (03 Dec 2020 14:20)  T(F): 97.6 (03 Dec 2020 14:20), Max: 97.6 (03 Dec 2020 14:20)  HR: 79 (03 Dec 2020 14:20) (79 - 79)  BP: 138/66 (03 Dec 2020 14:20) (138/66 - 138/66)  BP(mean): --  RR: 18 (03 Dec 2020 14:20) (18 - 18)  SpO2: 99% (03 Dec 2020 14:20) (99% - 99%)  _____________________________________________________________________________________  EXAM:  MR FOOT WAW IC LT        PROCEDURE DATE:  2020    INTERPRETATION:  Clinical History / Reason for exam: Laceration, osteomyelitis    Technique: Multiplanar multi sequential water and fat-weighted sequences are obtained through the left foot.   Following intravenous gadolinium injection of 9 cc with 1 cc discarded postcontrast imaging is obtained followed by subtraction sequences in the axial plane.    Comparison left foot x-ray 11/3/2020    Findings: There is skin and subcutaneous unencapsulated inflammatory change adjacent to a soft tissue laceration at the plantar aspect of midfoot.   At the dermal surface, there is a nonspecific small focus of metallic susceptibility artifact (series 100 image 30).    There is a lacerating trauma/partial tear of the central cord of plantar fascia adjacent to the flexor digitorum brevis without a drainable collection (series 100 image 27-30) with periapo neurotic edema.    Otherwise remaining bony structures demonstrate no fracture. There is no evidence of osteomyelitis or bone marrow infiltration. No fracture periosteal edema present.   Previously noted third metatarsal cortical lucency compatible with benign change. Midfoot intact with mild degenerative change. There is mild hallux MTP degenerative change..   There is mild talonavicular degenerative change.    Evaluation the forefoot demonstrates pressure point at the plantar aspect of fifth MTP with adducto varus deformity.   Plantar capsules are intact with pericapsular soft tissue thickening along the second through fourth with flattening of the forefoot parabola. There is mild hallux MTP degenerative change with small joint effusion.    Limited evaluation of the hindfoot demonstrates ATFL sprain. There is sinus Tarsi synovitis.    Impression:   Plantar midfoot soft tissue laceration with partial tear of the central cord of plantar fascia ; cellulitis without drainable collection or abscess formation  Focus of metallic susceptibility artifact within the plantar midfoot skin, correlate clinically for metallic foreign body, consider nonweightbearing foot x-ray  No MRI evidence of osteomyelitis    ASHLEY SMILEY MD; Attending Radiologist  This document has been electronically signed. 2020 10:43AM  _____________________________________________________________________________________               Physical Exam - Lower Extremity Focused:   Derm:   Bulge Noted on the Left Plantar Midfoot w/ Mild Erythema;  No Drainage or Open Wound Noted;  Mild Xerosis     Vascular: DP and PT Pulses Palpable;   Neuro: Protective Sensation Intact;   MSK: Pain On Palpation at Plantar Midfoot;     Assessment:  Foreign Body; Plantar Midfoot; Left     Plan:  Chart reviewed and Patient evaluated. All Questions and Concerns Addressed and Answered  Discussed diagnosis and treatment with patient  MRI Reviewed; See Report Above;   Sx Scheduled  @ 3:00PM; Foreign Body Removal w/ Wound Washout Left Foot;  Request Medical Clearance and OR Stratification;  NPO @ Midnight Thursday 12/3; Pre-Op Labs; Optimization;   Discussed Plan w/ Dr. Johnson;     Podiatry

## 2020-12-03 NOTE — H&P ADULT - ASSESSMENT
45F with PMH of HLD (controlled w/diet), prediabetes presented with sharp persistent 8/10 Left Plantar Midfoot pain that has been worsening since last 1 month    #L foot cellulitis  - MRI w/o cont L foot showed partial tear of plantar fascia central cord, cellulitis without abscess or osteomyelitis and suspected metallic artifact within the plantar midfoot skin  - f/u XRay 45F with PMH of HLD (controlled w/diet), prediabetes presented with sharp persistent 8/10 Left Plantar Midfoot pain that has been worsening since last 1 month    #L foot cellulitis  - MRI w/o cont L foot showed partial tear of plantar fascia central cord, cellulitis without abscess or osteomyelitis and suspected metallic artifact within the plantar midfoot skin  - f/u XRay  - Started  - f/u podiatry consult    #HLD - f/u lipid profile    #Pre-diabetes - f/u A1C    Activity - IAT  Diet - carb const  DVT ppx - lovenox bid  GI ppx - protonix 45F with PMH of HLD (controlled w/diet), prediabetes presented with sharp persistent 8/10 Left Plantar Midfoot pain that has been worsening since last 1 month    #L foot cellulitis  - MRI w/o cont L foot showed partial tear of plantar fascia central cord, cellulitis without abscess or osteomyelitis and suspected metallic artifact within the plantar midfoot skin  - f/u XRay  - Started IV cefazolin  - f/u podiatry consult    #HLD - f/u lipid profile    #Pre-diabetes - f/u A1C    Activity - IAT  Diet - carb const  DVT ppx - lovenox bid  GI ppx - protonix 45F with PMH of HLD (controlled w/diet), prediabetes presented with sharp persistent 8/10 Left Plantar Midfoot pain that has been worsening since last 1 month    #L foot cellulitis  - MRI w/o cont L foot showed partial tear of plantar fascia central cord, cellulitis without abscess or osteomyelitis and suspected metallic artifact within the plantar midfoot skin  - f/u XRay  - Started IV cefazolin  - Sx Scheduled Friday 12/4 @ 3:00PM; Foreign Body Removal w/ Wound Washout Left Foot;  - NPO from Midnight    #HLD - f/u lipid profile    #Pre-diabetes - f/u A1C    Activity - IAT  Diet - carb const  DVT ppx - lovenox bid  GI ppx - protonix

## 2020-12-03 NOTE — H&P ADULT - ATTENDING COMMENTS
45F with PMH of HLD (controlled w/diet), prediabetes presented with sharp persistent 8/10 Left Plantar Midfoot pain that has been worsening since last 1 month. She slipped while stepping outside her house and had a metal piece penetrate the L foot about a month ago. She received tetanus shot at that time and was advised to see podiatry Dr. Johnson who requested an MRI. Results were conveyed to patient today and was advised to got to ER for abx. MRI w/o cont L foot showed partial tear of plantar fascia central cord, cellulitis without abscess or osteomyelitis and suspected metallic artifact within the plantar midfoot skin.     Denies CP, SOB, N/V/D/C/AP, cough, F, chills, dizziness, new focal weakness, HA, vision changes, dysuria, or urinary symptoms, blood in stool.    ROS: all systems unremarkable except as above.     Gen: NAD, AA0x3  HEENT: PERRLA, EOMI, no LAD  CV: nl S1 S2  Resp: decreased BS b/l  GI: NT/ND/S +BS  MS: no c/c/e, +pulses, Lt plantar midfoot w/ induration. No edema, erythema, +tenderness.  Neuro: nonfocal, +reflexes    EKG - nonspecific changes (my read)  Chart and consultant notes personally reviewed.  Care Discussed with Consultants/Other Providers/ Housestaff [ x] YES [ ] NO   Radiology, labs, old records personally reviewed.    #L foot cellulitis w/ suspected foreign body  - MRI w/o cont L foot showed partial tear of plantar fascia central cord, cellulitis without abscess or osteomyelitis and suspected metallic artifact within the plantar midfoot skin  - Started IV cefazolin  - Sx Scheduled today; Foreign Body Removal w/ Wound Washout Left Foot;  - pt is low risk for low risk procedure (>>>4METS, no h/o CP, SOB)    #HLD - f/u lipid profile  #Obesity: counselled about wt loss  #Pre-diabetes - f/u A1C    Activity - IAT  Diet - carb const  DVT ppx - lovenox bid  GI ppx - protonix

## 2020-12-03 NOTE — ED PROVIDER NOTE - PHYSICAL EXAMINATION
VITAL SIGNS: I have reviewed nursing notes and confirm.  CONSTITUTIONAL: well-appearing, non-toxic, NAD  SKIN: Warm dry, normal skin turgor  HEAD: NCAT  EYES: EOMI, PERRLA, no scleral icterus  ENT: Moist mucous membranes, normal pharynx  NECK: Supple; non tender. Full ROM.  CARD: RRR, no murmurs, rubs or gallops  RESP: clear to ausculation b/l.  No rales, rhonchi, or wheezing.  ABD: soft, + BS, non-tender, non-distended  EXT: Full ROM, no bony tenderness, no pedal edema, no calf tenderness. left midfoot without drainage or erythema noted   NEURO: normal motor. normal sensory.   PSYCH: Cooperative, appropriate.

## 2020-12-03 NOTE — ED ADULT NURSE REASSESSMENT NOTE - NS ED NURSE REASSESS COMMENT FT1
pt received laying comfortably in hallway. pt a&ox4 denying pain/discomfort at this time. awaiting bed. will continue to monitor.

## 2020-12-03 NOTE — ED ADULT NURSE NOTE - CAS TRG GEN SKIN COLOR
[facility] Hematology & Oncology: Inpatient Hematology / Oncology Discharge Summary Note Patient ID: Avril Huggins [id number] 
41 y.o. 
1944 Admit Date: 11/7/2019 Discharge Date: 12/12/2019 Admission Diagnoses: Admission for antineoplastic chemotherapy [Z51.11] Discharge Diagnoses: 
Principal Diagnosis: <principal problem not specified> Active Problems: 
  Admission for antineoplastic chemotherapy (5/5/2019) Hospital Course: Ms. Mirlande Arndt is a 69 y.o. female admitted on 11/7/2019. 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 She failed induction with 7+3/Midostaurin. On Dacogen/Gilteritinib with recent BMbx with persistent residual disease. She was admitted for FLAG-VENITA. Her course of treatment was complicated by neutropenic fever and Strep/Enterococcus bacteremia. ID transitioned to oral Augmentin and would treat until oncology f/u. If WBC recovered at f/u, then would discontinue oral prophylaxis. ID discussed with pt that port is retained and that she will need to be watched when / if antibx are discontinued to make sure no infection relapses. She is Day 28 today. ANC up to 2.8. She is feeling better and is ready for discharge home today after repeat BMbx. She is scheduled for labs/replacements on 12/14 (will T&C today) and on 12/16. F/u scheduled with provider on 12/19. Advised to call with fever, chills, uncontrollable symptoms, or with any other concerns. AML 
- admit for salvage FLAG-VENITA 
- needs Echo prior - ordered 11/8 Day 1 FLAG-VENITA. Echo with EF 55-60%, proceed with tx. 
11/9 Day 2 FLAG-VENITA. Tolerating well, no issues. Uric acid 3.1 today. 11/10 Day 3 FLAG-VENITA. No issues with treatment. Uric acid 3.3 today. 11/12 Day 5 FLAG-VENITA. Tolerating treatment well. G-CSF to start tomorrow. 11/13 Day 6 FLAG-VENITA, doing well, Granix/claritin starts today 12/4 Day 27 FLAG VENITA. Awaiting count recovery, con't Granix. WBC up to 100 today. 12/8 Day 31. WBC up to 300. Con't Granix. Hold on BMbx pending count recovery. 12/10 Day 33. ANC up to 800. Con't Granix. Dr. Carolina Daiz to discuss timing of repeat BMbx with Dr. Benton Boxer. 12/11 Day 34. ANC 1600 - continue Granix for now. BMBx today or tomorrow and home after.  
  
Pancytopenia secondary to disease - Transfuse prn per [facility] SOPs 
  
R elbow pain 11/11 c/o R elbow pain with limited ROM yesterday. Xray neg. Pain improved today, noted bruising. Normal ROM on exam. 
  
Mild Abd discomfort/loose stools 11/13 discomfort is improved from yesterday, will monitor 11/14 loose stools, but not watery, can use Imodium prn 
11/15 Imodium working well  
11/16 controlled 11/26 Ova and parasite 11/22 pending. Check for C diff if stool appropriate. Continue anti diarrheal for now 
  
Neutropenic fever / Sepsis not present on admission / UTI / Strep/Enterococcus bacteremia 11/19 Tmax 102. 1.  UA +UTI. UCx pending. BCx-NGTD. CXR neg. On Cef/Vanc. DC prophylactic LVQ. 11/20 Tmax 102. BCx positive for streptococcus/enterococcus. Subculture in progress. On Cef/Vanc.   
11/22 repeat BC NTD. Alpha strep on vanc. 11/24 Repeat BC NTD. No fever. 11/25 new skin rash. ?RT to vanc. Consult ID for recommendations. 11/26 ID dc'd cefe and vanc. Started zosyn. TTE ordered. 12/2 Tmax 101. CXR neg.  UA neg. Repeat cultures. On Zosyn, restarted Vanc last PM. 
12/3 Tmax 102. Cultures NGTD. Con't Zosyn/Vanc. 12/4 Tmax 101.8.  ?r/t marrow recovery? UCx-NG. BCx-NGTD. Con't Vanc/Zosyn. ID following - checking stool for giardia/cryptospordium and repeating CMV. 12/5 Tmax 101.9. BCx-NGTD. ID DC'd Vanc yesterday. 12/8 Afebrile. BCx-NGTD. Stool studies and CMV neg. Fungitell <31. Con't Zosyn. ID following. 12/10 Con't Zosyn for now per ID. 
  
Fall 
11/19 s/p last PM.  No LOC. Hit head.   CT head neg. 
  
 Double vision 11/21 Neuro has seen patient. Concerns for possible 6th nerve palsy. Recommends LP. We will hold with WBC 0.0 and continue to monitor. 11/22 vision improved today. MRI brain pending. 11/23 MRI unremarkable. Discussed with Neuro. No need for LP 
11/27 Resolved 
  
Increased O2 needs / Hypoxia 12/5 O2 up to 4L via NC. Check CXR, BNP. Wt up 2# with +1.3L. Lasix 40mg IV x 1 ordered. 12/6 . CXR neg. Diuresed well. O2 down to 2L. Still with crackles, will repeat Lasix today. 12/7 Resolved. On RA 
  
Lack of appetite 12/4 Increase marinol 2.5mg from daily to BID. 
12/10 ongoing lack of appetite. Increase Marinol to 2.5mg in AM and 5mg in PM.  RD following. 12/11 Feeling better this morning. Will keep Marinol 2.5 mg BID. Consults: 
IP CONSULT TO NEUROLOGY 
IP CONSULT TO INFECTIOUS DISEASES 
IP CONSULT TO INTERVENTIONAL RADIOLOGY Pertinent Diagnostic Studies:  
Labs:   
Recent Labs 12/12/19 2011 12/11/19 
0359 12/10/19 
8159 WBC 3.7* 2.1* 1.4* HGB 7.3* 7.3* 7.7* PLT 21* 21* 29* ANEU 2.8 1.6* 0.8* Recent Labs 12/12/19 
3406 12/11/19 
0359 12/10/19 
2475  144 143  
K 3.9 4.1 4.1 * 110* 108* CO2 27 29 28  
GLU 94 92 95 BUN 12 11 13 CREA 0.70 0.68 0.74 CA 8.9 9.0 8.8 SGOT 15 13* 17  
 98 94  
TP 6.3 6.0* 6.4 ALB 2.5* 2.4* 2.5* MG 2.0 2.3 2.2 PHOS  --   --  3.0 Imaging: XR CHEST PA LAT [[id number]] Collected: 12/05/19 8278 Order Status: Completed Updated: 12/05/19 1019 Narrative:    
Two view chest 
 
History: increased O2 needs. Comparison: 12/01/2019 Findings: A left internal jugular Mediport catheter is unchanged. The heart and 
mediastinal silhouette are normal in size and configuration. The lungs and 
pleural spaces are clear. The pulmonary vascularity is within normal limits. The 
visualized osseous structures are unremarkable.   
Impression:    
Impression: No active disease in the chest. 
 
  
 XR CHEST PORT [[id number]] Collected: 12/01/19 2037 Order Status: Completed Updated: 12/01/19 2040 Narrative:    
Portable chest x-ray CLINICAL INDICATION: Evaluate for infiltrate, neutropenic fever FINDINGS: Single AP view the chest compared to a similar exam dated 11/20/2019 
show the lungs to be expanded and clear. A left-sided chest port is in place. No 
pleural effusion or pneumothorax. Impression:    
IMPRESSION: No acute cardiopulmonary abnormality. No infiltrate appreciated. MRI BRAIN W WO CONT [[id number]] Collected: 11/22/19 0827 Order Status: Completed Updated: 11/22/19 1002 Narrative:    
Clinical History: The patient is a 69years year old Female presenting with 
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 
was performed. In addition, high resolution axial and coronal pre and 
postcontrast images of the orbits were obtained. 18 mL of gadoterate meglumine (DOTAREM) 0.5 mmol/mL (376.9 mg/mL) contrast was administered intravenously. Findings: There is mild confluent chronic periventricular white matter disease with few 
lacunae throughout the corona radiata and centrum semiovale. Normal gray-white 
matter differentiation is seen for age. There are no areas of abnormal 
enhancement. There are no abnormal extra-axial fluid collections. No evidence of 
mass or mass effect is seen. There is no diffusion signal abnormality. Expected 
flow voids are maintained in the major intracranial vessels. High-resolution imaging of the orbits demonstrates symmetric lobes with 
unremarkable retrobulbar fat tissues. The optic nerves are symmetric as are the 
extraocular muscles. Specifically, there is no abnormality involving the lateral 
rectus muscles. The optic chiasm is unremarkable and there is no evidence of 
sellar or parasellar mass. There is mild chronic central pontine microvascular disease. There is no 
evidence of Chiari malformation. The ventricular system and CSF containing spaces are unremarkable in appearance. Visualized extracranial soft tissues are unremarkable. The paranasal sinuses are well pneumatized and aerated. Impression:    
Impression:   
 
1. Minimal chronic white matter disease without acute intracranial abnormality. 2. Unremarkable orbital MRI. CPT Code:  03593 XR CHEST SNGL V [[id number]] Collected: 11/20/19 1535 Order Status: Completed Updated: 11/20/19 1537 Narrative:    
Clinical History: The patient is a 69years year old Female presenting with 
symptoms of SOB Comparison:  Chest x-ray 11/18/2019 Findings:  Frontal view of the chest was obtained. The lung fields are clear. No pleural effusions are demonstrated.  The 
cardiomediastinal silhouette is within normal limits.  There are no acute 
osseous abnormalities. A left jugular chest port is in place. Impression:    
Impression:   
 
No acute cardiopulmonary process. CPT code(s) 03069 CT HEAD WO CONT [[id number]] Collected: 11/19/19 1050 Order Status: Completed Updated: 11/19/19 1054 Narrative:    
CT HEAD WITHOUT CONTRAST, 11/19/2019 History: Fall last night hitting left side of head Comparison: . Technique:   5 mm axial scans from the skull base to the vertex. All CT scans 
performed at this facility use one or all of the following: Automated exposure 
control, adjustment of the mA and/or kVp according to patient's size, iterative 
reconstruction. Findings:  No evidence of intracranial hemorrhage is seen. Faint bilateral basal 
ganglia calcifications are seen. No abnormal extra-axial fluid collections are 
seen.  Mild cortical involutional changes are seen which are not felt to be 
abnormal given the patient's age. Nery Walter evidence for acute hydrocephalus is seen. No evidence of midline shift or herniation is seen.  No abnormal edema pattern 
is seen in a vascular distribution to suggest large artery infarction. Evaluation with bone windows shows no acute osseous changes.  The visualized 
sinuses, middle ears, and mastoid air cells are well aerated. Impression:    
IMPRESSION:   
1.  No acute intracranial process evident by noncontrast CT study of the head. XR CHEST SNGL V [[id number]] Collected: 11/18/19 2041 Order Status: Completed Updated: 11/18/19 2044 Narrative:    
EXAM: XR CHEST SNGL V 
 
INDICATION: neutropenic fever COMPARISON: 9/29/2019 FINDINGS: A portable AP radiograph of the chest was obtained at 1946 hours. The 
patient is on a cardiac monitor. The lungs are clear. The cardiac and 
mediastinal contours and pulmonary vascularity are normal.  The bones and soft 
tissues are grossly within normal limits. Impression:    
IMPRESSION: Normal chest.  
XR ELBOW RT MIN 3 V [[id number]] Collected: 11/10/19 1421 Order Status: Completed Updated: 11/10/19 1424 Narrative:    
Right elbow Clinical location: Elbow pain after feeling a pop, decreased range of motion FINDINGS: Four views of the right elbow show no fracture or dislocation. There 
is no joint effusion. The soft tissues are unremarkable. Impression:    
IMPRESSION: No acute osseous abnormality or joint derangement of the right 
elbow. Current Discharge Medication List  
  
START taking these medications Details  
allopurinol (ZYLOPRIM) 300 mg tablet Take 1 Tab by mouth daily. Qty: 30 Tab, Refills: 0  
  
amoxicillin-clavulanate (AUGMENTIN) 875-125 mg per tablet Take 1 Tab by mouth two (2) times daily (with meals). Qty: 60 Tab, Refills: 0  
  
dronabinol (MARINOL) 2.5 mg capsule Take 1 Cap by mouth Before breakfast and dinner. Max Daily Amount: 5 mg. Qty: 60 Cap, Refills: 0 Associated Diagnoses: Acute myeloid leukemia not having achieved remission ([address] CONTINUE these medications which have CHANGED Details  
acyclovir (ZOVIRAX) 400 mg tablet Take 1 Tab by mouth two (2) times a day for 30 days. Qty: 60 Tab, Refills: 0  
  
voriconazole (VFEND) 200 mg tablet Take 1 Tab by mouth every twelve (12) hours. Qty: 60 Tab, Refills: 0 CONTINUE these medications which have NOT CHANGED Details DULoxetine (CYMBALTA) 30 mg capsule Take 1 Cap by mouth daily. Qty: 30 Cap, Refills: 3  
  
zolpidem (AMBIEN) 5 mg tablet Take 1 Tab by mouth nightly as needed for Sleep. Max Daily Amount: 5 mg. Qty: 30 Tab, Refills: 0 Associated Diagnoses: Acute myeloid leukemia not having achieved remission ([facility] [address] Insomnia, unspecified type  
  
cholecalciferol (VITAMIN D3) 400 unit tab tablet Take 2 Tabs by mouth daily. Qty: 60 Tab, Refills: 0  
  
vit C/E/zinc/lutein/zeaxanthin ([address] PO) Take 1 Tab by mouth daily. pravastatin (PRAVACHOL) 10 mg tablet Take  by mouth nightly. lidocaine-prilocaine (EMLA) topical cream Apply  to affected area as needed for Pain. Qty: 30 g, Refills: 0  
  
ondansetron hcl (ZOFRAN) 8 mg tablet Take 1 Tab by mouth every eight (8) hours as needed for Nausea. Qty: 90 Tab, Refills: 0 LORazepam (ATIVAN) 1 mg tablet Take  by mouth nightly. acetaminophen 500 mg tab 500 mg, diphenhydrAMINE 25 mg cap 25 mg Take  by mouth nightly. I have reviewed the patient's controlled substance prescription history, as maintained in the [place] prescription monitoring program, so that the prescriptions(s) for a controlled substance can be given. OBJECTIVE: 
Patient Vitals for the past 8 hrs: 
 BP Temp Pulse Resp SpO2  
19 0321 126/61 99.1 °F (37.3 °C) 94 18 91 % Temp (24hrs), Av.3 °F (36.8 °C), Min:97.6 °F (36.4 °C), Max:99.1 °F (37.3 °C) No intake/output data recorded. Physical Exam: Constitutional: Well developed, well nourished female in no acute distress, sitting comfortably in the hospital bed.  at bedside HEENT: Normocephalic and atraumatic. Oropharynx is clear, mucous membranes are moist. Extraocular muscles are intact. Sclerae anicteric. Skin Warm and dry. No erythema. + pallor at baseline. Bruising noted on BUE/R elbow. Respiratory Lungs CTAB,  normal air exchange without accessory muscle use. CVS Normal rate, regular rhythm and normal S1 and S2. No murmurs, gallops, or rubs. Abdomen Soft, nontender, nondistended, normoactive bowel sounds. Neuro Grossly nonfocal with no obvious sensory or motor deficits. MSK Normal range of motion in general.  No edema and no tenderness. Psych Appropriate mood and affect.   
  
 
 
ASSESSMENT: 
 
Active Problems: 
  Admission for antineoplastic chemotherapy (5/5/2019) DISPOSITION: 
Follow-up Appointments Procedures  FOLLOW UP VISIT Appointment in: Other (Specify) Please schedule infusion appt on 12/14 for labs/replacements. Follow up as scheduled on 12/16 for labs/replacements and f/u with provider on 12/19 Please schedule infusion appt on 12/14 for labs/replacements. Follow up as scheduled on 12/16 for labs/replacements and f/u with provider on 12/19 Standing Status:   Standing Number of Occurrences:   1 Order Specific Question:   Appointment in Answer: Other (Specify) Over 30 minutes was spent in discharge planning and coordination of care. Henok Fofana NP [facility] Hematology & Oncology [address] [address] Office : [phone] Fax : [phone] Normal for race

## 2020-12-03 NOTE — ED PROVIDER NOTE - ATTENDING CONTRIBUTION TO CARE
I personally evaluated the patient. I reviewed the Resident’s or Physician Assistant’s note (as assigned above), and agree with the findings and plan except as documented in my note.    Pt is a 46 y/o female with no PMH who presents to ED for FB to bottom of left foot with moderate swelling, tenderness, constant, worse with ambulation. Pt sent by podiatry for admission for OR removal of FB.    Constitutional: Well developed, well nourished. NAD.  Head: Normocephalic, atraumatic.  Eyes: PERRL. EOMI.  ENT: No nasal discharge. Mucous membranes moist.  Neck: Supple. Painless ROM.  Cardiovascular: Normal S1, S2. Regular rate and rhythm. No murmurs, rubs, or gallops.  Pulmonary: Normal respiratory rate and effort. Lungs clear to auscultation bilaterally. No wheezing, rales, or rhonchi.  Abdominal: Soft. Nondistended. Nontender. No rebound, guarding, rigidity.  Extremities. Pelvis stable. No lower extremity edema, symmetric calves.  Skin: No rashes, cyanosis. + swelling with palpable tenderness to bottom of left foot. No infection.  Neuro: AAOx3. No focal neurological deficits.  Psych: Normal mood. Normal affect.

## 2020-12-03 NOTE — ED PROVIDER NOTE - CLINICAL SUMMARY MEDICAL DECISION MAKING FREE TEXT BOX
Pt presented to ED for admission for podiatry surgery tomorrow to remove FB from foot. Pre-op labs sent. No abx per podiatry. Labs, imaging obtained. Will admit.

## 2020-12-04 ENCOUNTER — RESULT REVIEW (OUTPATIENT)
Age: 45
End: 2020-12-04

## 2020-12-04 DIAGNOSIS — Z02.9 ENCOUNTER FOR ADMINISTRATIVE EXAMINATIONS, UNSPECIFIED: ICD-10-CM

## 2020-12-04 DIAGNOSIS — S91.312A LACERATION WITHOUT FOREIGN BODY, LEFT FOOT, INITIAL ENCOUNTER: ICD-10-CM

## 2020-12-04 LAB
A1C WITH ESTIMATED AVERAGE GLUCOSE RESULT: 6.1 % — HIGH (ref 4–5.6)
ALBUMIN SERPL ELPH-MCNC: 4.1 G/DL — SIGNIFICANT CHANGE UP (ref 3.5–5.2)
ALP SERPL-CCNC: 80 U/L — SIGNIFICANT CHANGE UP (ref 30–115)
ALT FLD-CCNC: 34 U/L — SIGNIFICANT CHANGE UP (ref 0–41)
ANION GAP SERPL CALC-SCNC: 12 MMOL/L — SIGNIFICANT CHANGE UP (ref 7–14)
APPEARANCE UR: CLEAR — SIGNIFICANT CHANGE UP
AST SERPL-CCNC: 25 U/L — SIGNIFICANT CHANGE UP (ref 0–41)
BASOPHILS # BLD AUTO: 0.02 K/UL — SIGNIFICANT CHANGE UP (ref 0–0.2)
BASOPHILS NFR BLD AUTO: 0.3 % — SIGNIFICANT CHANGE UP (ref 0–1)
BILIRUB SERPL-MCNC: 0.3 MG/DL — SIGNIFICANT CHANGE UP (ref 0.2–1.2)
BILIRUB UR-MCNC: NEGATIVE — SIGNIFICANT CHANGE UP
BLD GP AB SCN SERPL QL: SIGNIFICANT CHANGE UP
BUN SERPL-MCNC: 8 MG/DL — LOW (ref 10–20)
CALCIUM SERPL-MCNC: 8.4 MG/DL — LOW (ref 8.5–10.1)
CHLORIDE SERPL-SCNC: 103 MMOL/L — SIGNIFICANT CHANGE UP (ref 98–110)
CHOLEST SERPL-MCNC: 157 MG/DL — SIGNIFICANT CHANGE UP
CO2 SERPL-SCNC: 23 MMOL/L — SIGNIFICANT CHANGE UP (ref 17–32)
COLOR SPEC: YELLOW — SIGNIFICANT CHANGE UP
CREAT SERPL-MCNC: 0.6 MG/DL — LOW (ref 0.7–1.5)
DIFF PNL FLD: NEGATIVE — SIGNIFICANT CHANGE UP
EOSINOPHIL # BLD AUTO: 0.28 K/UL — SIGNIFICANT CHANGE UP (ref 0–0.7)
EOSINOPHIL NFR BLD AUTO: 4.2 % — SIGNIFICANT CHANGE UP (ref 0–8)
ESTIMATED AVERAGE GLUCOSE: 128 MG/DL — HIGH (ref 68–114)
GLUCOSE SERPL-MCNC: 96 MG/DL — SIGNIFICANT CHANGE UP (ref 70–99)
GLUCOSE UR QL: NEGATIVE — SIGNIFICANT CHANGE UP
HCG UR QL: NEGATIVE — SIGNIFICANT CHANGE UP
HCT VFR BLD CALC: 37 % — SIGNIFICANT CHANGE UP (ref 37–47)
HDLC SERPL-MCNC: 47 MG/DL — LOW
HGB BLD-MCNC: 11.5 G/DL — LOW (ref 12–16)
IMM GRANULOCYTES NFR BLD AUTO: 0.3 % — SIGNIFICANT CHANGE UP (ref 0.1–0.3)
KETONES UR-MCNC: NEGATIVE — SIGNIFICANT CHANGE UP
LEUKOCYTE ESTERASE UR-ACNC: NEGATIVE — SIGNIFICANT CHANGE UP
LIPID PNL WITH DIRECT LDL SERPL: 104 MG/DL — HIGH
LYMPHOCYTES # BLD AUTO: 1.42 K/UL — SIGNIFICANT CHANGE UP (ref 1.2–3.4)
LYMPHOCYTES # BLD AUTO: 21.1 % — SIGNIFICANT CHANGE UP (ref 20.5–51.1)
MCHC RBC-ENTMCNC: 25.4 PG — LOW (ref 27–31)
MCHC RBC-ENTMCNC: 31.1 G/DL — LOW (ref 32–37)
MCV RBC AUTO: 81.9 FL — SIGNIFICANT CHANGE UP (ref 81–99)
MONOCYTES # BLD AUTO: 0.45 K/UL — SIGNIFICANT CHANGE UP (ref 0.1–0.6)
MONOCYTES NFR BLD AUTO: 6.7 % — SIGNIFICANT CHANGE UP (ref 1.7–9.3)
NEUTROPHILS # BLD AUTO: 4.55 K/UL — SIGNIFICANT CHANGE UP (ref 1.4–6.5)
NEUTROPHILS NFR BLD AUTO: 67.4 % — SIGNIFICANT CHANGE UP (ref 42.2–75.2)
NITRITE UR-MCNC: NEGATIVE — SIGNIFICANT CHANGE UP
NON HDL CHOLESTEROL: 110 MG/DL — SIGNIFICANT CHANGE UP
NRBC # BLD: 0 /100 WBCS — SIGNIFICANT CHANGE UP (ref 0–0)
PH UR: 6 — SIGNIFICANT CHANGE UP (ref 5–8)
PLATELET # BLD AUTO: 239 K/UL — SIGNIFICANT CHANGE UP (ref 130–400)
POTASSIUM SERPL-MCNC: 4.2 MMOL/L — SIGNIFICANT CHANGE UP (ref 3.5–5)
POTASSIUM SERPL-SCNC: 4.2 MMOL/L — SIGNIFICANT CHANGE UP (ref 3.5–5)
PROT SERPL-MCNC: 6.6 G/DL — SIGNIFICANT CHANGE UP (ref 6–8)
PROT UR-MCNC: SIGNIFICANT CHANGE UP
RBC # BLD: 4.52 M/UL — SIGNIFICANT CHANGE UP (ref 4.2–5.4)
RBC # FLD: 14.3 % — SIGNIFICANT CHANGE UP (ref 11.5–14.5)
SODIUM SERPL-SCNC: 138 MMOL/L — SIGNIFICANT CHANGE UP (ref 135–146)
SP GR SPEC: 1.03 — SIGNIFICANT CHANGE UP (ref 1.01–1.03)
TRIGL SERPL-MCNC: 91 MG/DL — SIGNIFICANT CHANGE UP
UROBILINOGEN FLD QL: SIGNIFICANT CHANGE UP
WBC # BLD: 6.74 K/UL — SIGNIFICANT CHANGE UP (ref 4.8–10.8)
WBC # FLD AUTO: 6.74 K/UL — SIGNIFICANT CHANGE UP (ref 4.8–10.8)

## 2020-12-04 PROCEDURE — 88300 SURGICAL PATH GROSS: CPT | Mod: 26

## 2020-12-04 PROCEDURE — 99223 1ST HOSP IP/OBS HIGH 75: CPT | Mod: AI

## 2020-12-04 PROCEDURE — 97597 DBRDMT OPN WND 1ST 20 CM/<: CPT | Mod: 58,59

## 2020-12-04 PROCEDURE — 73630 X-RAY EXAM OF FOOT: CPT | Mod: 26,LT

## 2020-12-04 PROCEDURE — 28003 TREATMENT OF FOOT INFECTION: CPT | Mod: 58,59

## 2020-12-04 RX ORDER — CEFAZOLIN SODIUM 1 G
1000 VIAL (EA) INJECTION EVERY 8 HOURS
Refills: 0 | Status: DISCONTINUED | OUTPATIENT
Start: 2020-12-04 | End: 2020-12-05

## 2020-12-04 RX ORDER — OXYCODONE AND ACETAMINOPHEN 5; 325 MG/1; MG/1
1 TABLET ORAL EVERY 6 HOURS
Refills: 0 | Status: DISCONTINUED | OUTPATIENT
Start: 2020-12-04 | End: 2020-12-05

## 2020-12-04 RX ORDER — ONDANSETRON 8 MG/1
4 TABLET, FILM COATED ORAL ONCE
Refills: 0 | Status: DISCONTINUED | OUTPATIENT
Start: 2020-12-04 | End: 2020-12-04

## 2020-12-04 RX ORDER — CHLORHEXIDINE GLUCONATE 213 G/1000ML
1 SOLUTION TOPICAL
Refills: 0 | Status: DISCONTINUED | OUTPATIENT
Start: 2020-12-04 | End: 2020-12-05

## 2020-12-04 RX ORDER — PANTOPRAZOLE SODIUM 20 MG/1
40 TABLET, DELAYED RELEASE ORAL
Refills: 0 | Status: DISCONTINUED | OUTPATIENT
Start: 2020-12-04 | End: 2020-12-05

## 2020-12-04 RX ORDER — SODIUM CHLORIDE 9 MG/ML
1000 INJECTION, SOLUTION INTRAVENOUS
Refills: 0 | Status: DISCONTINUED | OUTPATIENT
Start: 2020-12-04 | End: 2020-12-04

## 2020-12-04 RX ORDER — ENOXAPARIN SODIUM 100 MG/ML
40 INJECTION SUBCUTANEOUS DAILY
Refills: 0 | Status: DISCONTINUED | OUTPATIENT
Start: 2020-12-04 | End: 2020-12-05

## 2020-12-04 RX ORDER — HYDROMORPHONE HYDROCHLORIDE 2 MG/ML
0.5 INJECTION INTRAMUSCULAR; INTRAVENOUS; SUBCUTANEOUS
Refills: 0 | Status: DISCONTINUED | OUTPATIENT
Start: 2020-12-04 | End: 2020-12-04

## 2020-12-04 RX ORDER — MORPHINE SULFATE 50 MG/1
2 CAPSULE, EXTENDED RELEASE ORAL
Refills: 0 | Status: DISCONTINUED | OUTPATIENT
Start: 2020-12-04 | End: 2020-12-04

## 2020-12-04 RX ADMIN — CHLORHEXIDINE GLUCONATE 1 APPLICATION(S): 213 SOLUTION TOPICAL at 05:30

## 2020-12-04 RX ADMIN — Medication 100 MILLIGRAM(S): at 21:50

## 2020-12-04 RX ADMIN — Medication 100 MILLIGRAM(S): at 06:11

## 2020-12-04 RX ADMIN — ENOXAPARIN SODIUM 40 MILLIGRAM(S): 100 INJECTION SUBCUTANEOUS at 11:55

## 2020-12-04 RX ADMIN — PANTOPRAZOLE SODIUM 40 MILLIGRAM(S): 20 TABLET, DELAYED RELEASE ORAL at 06:11

## 2020-12-04 RX ADMIN — Medication 100 MILLIGRAM(S): at 13:46

## 2020-12-04 NOTE — BRIEF OPERATIVE NOTE - NSICDXBRIEFPROCEDURE_GEN_ALL_CORE_FT
PROCEDURES:  Pulsed lavage with suction 04-Dec-2020 19:52:56  Shaan Johnson  Removal of foreign body from toe of left foot 04-Dec-2020 19:52:48  Shaan Johnson

## 2020-12-04 NOTE — PRE-OP CHECKLIST - SELECT TESTS ORDERED
CMP/Type and Screen/BMP/COVID/PT/PTT/CXR/CBC/INR/EKG BMP/PT/PTT/Type and Screen/UCG/COVID/CXR/CBC/CMP/INR/EKG/u preg neg 12/4/20

## 2020-12-04 NOTE — CHART NOTE - NSCHARTNOTEFT_GEN_A_CORE
PACU ANESTHESIA PACU ADMISSION NOTE      Procedure:Pulsed lavage with suction    Removal of foreign body from toe of left foot      Post op diagnosisLaceration of left foot without foreign body        ____ Intubated  TV:______       Rate: ______      FiO2: ______    x____ Patent Airway    ___x_ Full return of protective reflexes    ____ Full recovery from anesthesia / sedation to baseline status    Viitals:  see anesthesia record          Mental Status:  __x__ Awake   _____ Alert   _____ Drowsy   _____ Sedated    Nausea/Vomiting: ____ Yes, See Post - Op Orders      _x___ No    Pain Scale (0-10): _____    Treatment: ____ None    _x___ See Post - Op/PCA Orders    Post - Operative Fluids:   ____ Oral   __x__ See Post - Op Orders    Plan:         Discharge:   ____Home       _x____Floor         _____Critical Care    _____Other:_________________    Comments: uneventful perioperative course; no s/s of anesthesia complications noted; D/C floor when criteria met

## 2020-12-04 NOTE — PROGRESS NOTE ADULT - SUBJECTIVE AND OBJECTIVE BOX
GIOVANNY OTERO 45y Female  MRN#: [id number]   CODE STATUS:________      SUBJECTIVE    Today is hospital day 1d, and this morning she is awak eports ________ overnight events.     Present Today:           Perez Catheter ()No/ ()Yes? Indication:          Central Line ()No/ ()Yes? Indication:          IV Fluids ()No/ ()Yes? Type:  Rate:  Indication:      OBJECTIVE  PAST MEDICAL & SURGICAL HISTORY  IBS (irritable bowel syndrome)    S/P     Scalp mass  removal of scalp mass       ALLERGIES:  No Known Allergies    MEDICATIONS:  STANDING MEDICATIONS  ceFAZolin   IVPB      ceFAZolin   IVPB 1000 milliGRAM(s) IV Intermittent every 8 hours  chlorhexidine 4% Liquid 1 Application(s) Topical <User Schedule>  enoxaparin Injectable 40 milliGRAM(s) SubCutaneous daily  pantoprazole    Tablet 40 milliGRAM(s) Oral before breakfast    PRN MEDICATIONS      VITAL SIGNS: Last 24 Hours  T(C): 36.4 (04 Dec 2020 05:50), Max: 36.9 (04 Dec 2020 01:08)  T(F): 97.6 (04 Dec 2020 05:50), Max: 98.4 (04 Dec 2020 01:08)  HR: 61 (04 Dec 2020 05:50) (61 - 79)  BP: 134/65 (04 Dec 2020 05:50) (134/65 - 145/58)  BP(mean): --  RR: 18 (04 Dec 2020 05:50) (18 - 19)  SpO2: 99% (04 Dec 2020 01:41) (99% - 99%)    LABS:                        11.5   6.74  )-----------( 239      ( 04 Dec 2020 06:18 )             37.0     12-04    138  |  103  |  8<L>  ----------------------------<  96  4.2   |  23  |  0.6<L>    Ca    8.4<L>      04 Dec 2020 06:18  Phos  3.7     12-03  Mg     2.1     12-03    TPro  6.6  /  Alb  4.1  /  TBili  0.3  /  DBili  x   /  AST  25  /  ALT  34  /  AlkPhos  80  12-04    PT/INR - ( 03 Dec 2020 15:25 )   PT: 12.10 sec;   INR: 1.05 ratio         PTT - ( 03 Dec 2020 15:25 )  PTT:30.2 sec  Urinalysis Basic - ( 04 Dec 2020 06:30 )    Color: Yellow / Appearance: Clear / S.026 / pH: x  Gluc: x / Ketone: Negative  / Bili: Negative / Urobili: <2 mg/dL   Blood: x / Protein: Trace / Nitrite: Negative   Leuk Esterase: Negative / RBC: x / WBC x   Sq Epi: x / Non Sq Epi: x / Bacteria: x    RADIOLOGY:      PHYSICAL EXAM:    GENERAL: NAD, well-developed, AAOx3  HEENT:  Atraumatic, Normocephalic. EOMI, PERRLA, conjunctiva and sclera clear, No JVD  PULMONARY: Clear to auscultation bilaterally; No wheeze  CARDIOVASCULAR: Regular rate and rhythm; No murmurs, rubs, or gallops  GASTROINTESTINAL: Soft, Nontender, Nondistended; Bowel sounds present  MUSCULOSKELETAL:  2+ Peripheral Pulses, No clubbing, cyanosis, or edema  NEUROLOGY: non-focal  SKIN: on the  plantar aspect of L foot there is scaly desquamating skin around the wound, there is a yellow circular discoloration on plantar aspect of L foot      ASSESSMENT & PLAN    FOOT ULCER     GIOVANNY OTERO 45y Female  MRN#: [id number]   CODE STATUS: FULL CODE    SUBJECTIVE:    24 H EVENTS:  Today is hospital day 1d, and this morning she is awake alert and receptive to questioning. She reports no overnight events. Patient reports some foot pain around the lesion this morning.       OBJECTIVE  PAST MEDICAL & SURGICAL HISTORY  IBS (irritable bowel syndrome)    S/P     Scalp mass  removal of scalp mass     ALLERGIES:  No Known Allergies    MEDICATIONS:  STANDING MEDICATIONS  ceFAZolin   IVPB      ceFAZolin   IVPB 1000 milliGRAM(s) IV Intermittent every 8 hours  chlorhexidine 4% Liquid 1 Application(s) Topical <User Schedule>  enoxaparin Injectable 40 milliGRAM(s) SubCutaneous daily  pantoprazole    Tablet 40 milliGRAM(s) Oral before breakfast    PRN MEDICATIONS    VITAL SIGNS: Last 24 Hours  T(C): 36.4 (04 Dec 2020 05:50), Max: 36.9 (04 Dec 2020 01:08)  T(F): 97.6 (04 Dec 2020 05:50), Max: 98.4 (04 Dec 2020 01:08)  HR: 61 (04 Dec 2020 05:50) (61 - 79)  BP: 134/65 (04 Dec 2020 05:50) (134/65 - 145/58)  BP(mean): --  RR: 18 (04 Dec 2020 05:50) (18 - 19)  SpO2: 99% (04 Dec 2020 01:41) (99% - 99%)    PHYSICAL EXAM:  GENERAL: NAD, well-developed, AAOx3  HEENT:  Atraumatic, Normocephalic. EOMI, PERRLA, conjunctiva and sclera clear, No JVD  PULMONARY: Clear to auscultation bilaterally; No wheeze  CARDIOVASCULAR: Regular rate and rhythm; No murmurs, rubs, or gallops  GASTROINTESTINAL: Soft, Nontender, Nondistended; Bowel sounds present  MUSCULOSKELETAL:  2+ Peripheral Pulses, No clubbing, cyanosis, or edema  NEUROLOGY: non-focal  SKIN: on the  plantar aspect of L foot there is scaly desquamating skin around the wound, there is a yellow circular discoloration on plantar aspect of L foot    LABS:                        11.5   6.74  )-----------( 239      ( 04 Dec 2020 06:18 )             37.0     12-04    138  |  103  |  8<L>  ----------------------------<  96  4.2   |  23  |  0.6<L>    Ca    8.4<L>      04 Dec 2020 06:18  Phos  3.7     12-  Mg     2.1         TPro  6.6  /  Alb  4.1  /  TBili  0.3  /  DBili  x   /  AST  25  /  ALT  34  /  AlkPhos  80  1204    PT/INR - ( 03 Dec 2020 15:25 )   PT: 12.10 sec;   INR: 1.05 ratio         PTT - ( 03 Dec 2020 15:25 )  PTT:30.2 sec  Urinalysis Basic - ( 04 Dec 2020 06:30 )    Color: Yellow / Appearance: Clear / S.026 / pH: x  Gluc: x / Ketone: Negative  / Bili: Negative / Urobili: <2 mg/dL   Blood: x / Protein: Trace / Nitrite: Negative   Leuk Esterase: Negative / RBC: x / WBC x   Sq Epi: x / Non Sq Epi: x / Bacteria: x    RADIOLOGY:  < from: MR Foot w/wo IV Cont, Left (20 @ 09:48) >  Impression: Plantar midfoot soft tissue laceration with partial tear of the central cord of plantar fascia ; cellulitis without drainable collection or abscess formation    Focus of metallic susceptibility artifact within the plantar midfoot skin, correlate clinically for metallic foreign body, consider nonweightbearing foot x-ray    No MRI evidence of osteomyelitis    < end of copied text >  < from: Xray Foot AP + Lateral + Oblique, Left (20 @ 16:30) >  3 views of the left foot. Comparison is made with a study dated November 3, 2020.No acute fracture or malalignment. The articular surfaces are normal. There is no erosion or soft tissue swelling.    Impression:    Unremarkable left foot.    < end of copied text >    ASSESSMENT & PLAN    #L FOOT CELLULITIS:  - MRI: Plantar midfoot soft tissue laceration with partial tear of the central cord of plantar fascia ; cellulitis without drainable collection or abscess formation Focus of metallic susceptibility artifact within the plantar midfoot skin, correlate clinically for metallic foreign body, consider nonweightbearing foot x-ray  - X-RAY: unremarkable left foot findings  - on IV cefazolin  - Podiatry consult appreciated  - scheduled for Sx today for foot wound at 3:00pm, will follow up  - pre-op clearance complete    # Pre-Diabetes  -  serum glucose 96  - follow up with outpatient PCP  - check A1C levels    # HLD  - labs on  TG, HDL, LDL are relatively normal  - follow up with lipid profile outpatient  - follow up with PCP outpatient   GIOVANNY OTERO 45y Female  MRN#: [id number]   CODE STATUS: FULL CODE    SUBJECTIVE:    24 H EVENTS:  Today is hospital day 1d, and this morning she is awake alert and receptive to questioning. She reports no overnight events. Patient reports some foot pain around the lesion this morning.       OBJECTIVE  PAST MEDICAL & SURGICAL HISTORY  IBS (irritable bowel syndrome)    S/P     Scalp mass  removal of scalp mass     ALLERGIES:  No Known Allergies    MEDICATIONS:  STANDING MEDICATIONS  ceFAZolin   IVPB      ceFAZolin   IVPB 1000 milliGRAM(s) IV Intermittent every 8 hours  chlorhexidine 4% Liquid 1 Application(s) Topical <User Schedule>  enoxaparin Injectable 40 milliGRAM(s) SubCutaneous daily  pantoprazole    Tablet 40 milliGRAM(s) Oral before breakfast    PRN MEDICATIONS    VITAL SIGNS: Last 24 Hours  T(C): 36.4 (04 Dec 2020 05:50), Max: 36.9 (04 Dec 2020 01:08)  T(F): 97.6 (04 Dec 2020 05:50), Max: 98.4 (04 Dec 2020 01:08)  HR: 61 (04 Dec 2020 05:50) (61 - 79)  BP: 134/65 (04 Dec 2020 05:50) (134/65 - 145/58)  BP(mean): --  RR: 18 (04 Dec 2020 05:50) (18 - 19)  SpO2: 99% (04 Dec 2020 01:41) (99% - 99%)    PHYSICAL EXAM:  GENERAL: NAD, well-developed, AAOx3  HEENT:  Atraumatic, Normocephalic. EOMI, PERRLA, conjunctiva and sclera clear, No JVD  PULMONARY: Clear to auscultation bilaterally; No wheeze  CARDIOVASCULAR: Regular rate and rhythm; No murmurs, rubs, or gallops  GASTROINTESTINAL: Soft, Nontender, Nondistended; Bowel sounds present  MUSCULOSKELETAL:  2+ Peripheral Pulses, No clubbing, cyanosis, or edema  NEUROLOGY: non-focal  SKIN: on the  plantar aspect of L foot there is scaly desquamating skin around the wound, there is a yellow circular discoloration on plantar aspect of L foot    LABS:                        11.5   6.74  )-----------( 239      ( 04 Dec 2020 06:18 )             37.0     12-04    138  |  103  |  8<L>  ----------------------------<  96  4.2   |  23  |  0.6<L>    Ca    8.4<L>      04 Dec 2020 06:18  Phos  3.7     12-  Mg     2.1         TPro  6.6  /  Alb  4.1  /  TBili  0.3  /  DBili  x   /  AST  25  /  ALT  34  /  AlkPhos  80  1204    PT/INR - ( 03 Dec 2020 15:25 )   PT: 12.10 sec;   INR: 1.05 ratio         PTT - ( 03 Dec 2020 15:25 )  PTT:30.2 sec  Urinalysis Basic - ( 04 Dec 2020 06:30 )    Color: Yellow / Appearance: Clear / S.026 / pH: x  Gluc: x / Ketone: Negative  / Bili: Negative / Urobili: <2 mg/dL   Blood: x / Protein: Trace / Nitrite: Negative   Leuk Esterase: Negative / RBC: x / WBC x   Sq Epi: x / Non Sq Epi: x / Bacteria: x    RADIOLOGY:  < from: MR Foot w/wo IV Cont, Left (20 @ 09:48) >  Impression: Plantar midfoot soft tissue laceration with partial tear of the central cord of plantar fascia ; cellulitis without drainable collection or abscess formation    Focus of metallic susceptibility artifact within the plantar midfoot skin, correlate clinically for metallic foreign body, consider nonweightbearing foot x-ray    No MRI evidence of osteomyelitis    < end of copied text >  < from: Xray Foot AP + Lateral + Oblique, Left (20 @ 16:30) >  3 views of the left foot. Comparison is made with a study dated November 3, 2020.No acute fracture or malalignment. The articular surfaces are normal. There is no erosion or soft tissue swelling.    Impression:    Unremarkable left foot.    < end of copied text >    ASSESSMENT & PLAN    #L foot cellulitis  - MRI: Plantar midfoot soft tissue laceration with partial tear of the central cord of plantar fascia ; cellulitis without drainable collection or abscess formation Focus of metallic susceptibility artifact within the plantar midfoot skin, correlate clinically for metallic foreign body, consider nonweightbearing foot x-ray  - X-RAY: unremarkable left foot findings  - on IV cefazolin  - Podiatry consult appreciated  - scheduled for Sx today for foot wound at 3:00pm, will follow up  - pre-op clearance complete    # Pre-Diabetes  -  serum glucose 96  - follow up with outpatient PCP  - check A1C levels    # HLD  - labs on  TG, HDL, LDL are relatively normal  - follow up with lipid profile outpatient  - follow up with PCP outpatient    GI ppx: pantoprazole  DVT ppx: lovenox  Activity: IAT  Diet: PO

## 2020-12-04 NOTE — BRIEF OPERATIVE NOTE - NSICDXBRIEFPREOP_GEN_ALL_CORE_FT
PRE-OP DIAGNOSIS:  Laceration of left foot without foreign body 04-Dec-2020 19:53:12  Shaan Johnson

## 2020-12-04 NOTE — BRIEF OPERATIVE NOTE - NSICDXBRIEFPOSTOP_GEN_ALL_CORE_FT
POST-OP DIAGNOSIS:  Laceration of left foot without foreign body 04-Dec-2020 19:53:24  Shaan Johnson

## 2020-12-04 NOTE — PATIENT PROFILE ADULT - NSTOBACCONEVERSMOKERY/N_GEN_A
Clinic hours for [facility]:  Monday 7 am - 5 pm  Tuesday 7 am - 5 pm  Wednesday 7 am - 5 pm  Thursday 7 am - 5 pm  Friday  7 am - 4 pm      If you need a refill on your prescription please call your pharmacy and let them know. Please be proactive and call before your medication runs out. The pharmacy will then contact us for the refill. Please allow 24-48 hours for the refill to be processed.     If your physician has ordered additional laboratory or radiology testing as part of your ongoing plan of care, please allow 7-10 business days from the day of your lab draw or test for the results to be sent and reviewed by your provider. If your results are critical and require more immediate intervention, you will be contacted sooner. Your results will be conveyed to you via a phone call or letter.    You may be receiving a patient satisfaction survey in the mail. Please take the time to complete, as your feedback is very important to us. We strive to make your experience exceptional and your comments help us with that goal. We look forward to hearing from you.       No

## 2020-12-05 ENCOUNTER — TRANSCRIPTION ENCOUNTER (OUTPATIENT)
Age: 45
End: 2020-12-05

## 2020-12-05 VITALS
TEMPERATURE: 98 F | RESPIRATION RATE: 18 BRPM | SYSTOLIC BLOOD PRESSURE: 132 MMHG | DIASTOLIC BLOOD PRESSURE: 67 MMHG | HEART RATE: 75 BPM

## 2020-12-05 LAB
ALBUMIN SERPL ELPH-MCNC: 3.8 G/DL — SIGNIFICANT CHANGE UP (ref 3.5–5.2)
ALP SERPL-CCNC: 80 U/L — SIGNIFICANT CHANGE UP (ref 30–115)
ALT FLD-CCNC: 34 U/L — SIGNIFICANT CHANGE UP (ref 0–41)
ANION GAP SERPL CALC-SCNC: 9 MMOL/L — SIGNIFICANT CHANGE UP (ref 7–14)
AST SERPL-CCNC: 32 U/L — SIGNIFICANT CHANGE UP (ref 0–41)
BASOPHILS # BLD AUTO: 0.03 K/UL — SIGNIFICANT CHANGE UP (ref 0–0.2)
BASOPHILS NFR BLD AUTO: 0.4 % — SIGNIFICANT CHANGE UP (ref 0–1)
BILIRUB SERPL-MCNC: <0.2 MG/DL — SIGNIFICANT CHANGE UP (ref 0.2–1.2)
BUN SERPL-MCNC: 13 MG/DL — SIGNIFICANT CHANGE UP (ref 10–20)
CALCIUM SERPL-MCNC: 8 MG/DL — LOW (ref 8.5–10.1)
CHLORIDE SERPL-SCNC: 106 MMOL/L — SIGNIFICANT CHANGE UP (ref 98–110)
CO2 SERPL-SCNC: 25 MMOL/L — SIGNIFICANT CHANGE UP (ref 17–32)
CREAT SERPL-MCNC: 0.7 MG/DL — SIGNIFICANT CHANGE UP (ref 0.7–1.5)
CULTURE RESULTS: SIGNIFICANT CHANGE UP
EOSINOPHIL # BLD AUTO: 0.32 K/UL — SIGNIFICANT CHANGE UP (ref 0–0.7)
EOSINOPHIL NFR BLD AUTO: 4 % — SIGNIFICANT CHANGE UP (ref 0–8)
GLUCOSE SERPL-MCNC: 120 MG/DL — HIGH (ref 70–99)
HCT VFR BLD CALC: 36.4 % — LOW (ref 37–47)
HGB BLD-MCNC: 11.2 G/DL — LOW (ref 12–16)
IMM GRANULOCYTES NFR BLD AUTO: 0.5 % — HIGH (ref 0.1–0.3)
LYMPHOCYTES # BLD AUTO: 1.29 K/UL — SIGNIFICANT CHANGE UP (ref 1.2–3.4)
LYMPHOCYTES # BLD AUTO: 16.1 % — LOW (ref 20.5–51.1)
MAGNESIUM SERPL-MCNC: 2 MG/DL — SIGNIFICANT CHANGE UP (ref 1.8–2.4)
MCHC RBC-ENTMCNC: 25.8 PG — LOW (ref 27–31)
MCHC RBC-ENTMCNC: 30.8 G/DL — LOW (ref 32–37)
MCV RBC AUTO: 83.9 FL — SIGNIFICANT CHANGE UP (ref 81–99)
MONOCYTES # BLD AUTO: 0.58 K/UL — SIGNIFICANT CHANGE UP (ref 0.1–0.6)
MONOCYTES NFR BLD AUTO: 7.2 % — SIGNIFICANT CHANGE UP (ref 1.7–9.3)
NEUTROPHILS # BLD AUTO: 5.76 K/UL — SIGNIFICANT CHANGE UP (ref 1.4–6.5)
NEUTROPHILS NFR BLD AUTO: 71.8 % — SIGNIFICANT CHANGE UP (ref 42.2–75.2)
NRBC # BLD: 0 /100 WBCS — SIGNIFICANT CHANGE UP (ref 0–0)
PLATELET # BLD AUTO: 229 K/UL — SIGNIFICANT CHANGE UP (ref 130–400)
POTASSIUM SERPL-MCNC: 4.1 MMOL/L — SIGNIFICANT CHANGE UP (ref 3.5–5)
POTASSIUM SERPL-SCNC: 4.1 MMOL/L — SIGNIFICANT CHANGE UP (ref 3.5–5)
PROT SERPL-MCNC: 6 G/DL — SIGNIFICANT CHANGE UP (ref 6–8)
RBC # BLD: 4.34 M/UL — SIGNIFICANT CHANGE UP (ref 4.2–5.4)
RBC # FLD: 13.9 % — SIGNIFICANT CHANGE UP (ref 11.5–14.5)
SODIUM SERPL-SCNC: 140 MMOL/L — SIGNIFICANT CHANGE UP (ref 135–146)
SPECIMEN SOURCE: SIGNIFICANT CHANGE UP
WBC # BLD: 8.02 K/UL — SIGNIFICANT CHANGE UP (ref 4.8–10.8)
WBC # FLD AUTO: 8.02 K/UL — SIGNIFICANT CHANGE UP (ref 4.8–10.8)

## 2020-12-05 PROCEDURE — 99239 HOSP IP/OBS DSCHRG MGMT >30: CPT

## 2020-12-05 RX ORDER — OXYCODONE HYDROCHLORIDE 5 MG/1
1 TABLET ORAL
Qty: 6 | Refills: 0
Start: 2020-12-05 | End: 2020-12-07

## 2020-12-05 RX ADMIN — ENOXAPARIN SODIUM 40 MILLIGRAM(S): 100 INJECTION SUBCUTANEOUS at 11:09

## 2020-12-05 RX ADMIN — Medication 100 MILLIGRAM(S): at 13:52

## 2020-12-05 RX ADMIN — Medication 100 MILLIGRAM(S): at 05:21

## 2020-12-05 RX ADMIN — OXYCODONE AND ACETAMINOPHEN 1 TABLET(S): 5; 325 TABLET ORAL at 11:06

## 2020-12-05 RX ADMIN — PANTOPRAZOLE SODIUM 40 MILLIGRAM(S): 20 TABLET, DELAYED RELEASE ORAL at 05:21

## 2020-12-05 RX ADMIN — OXYCODONE AND ACETAMINOPHEN 1 TABLET(S): 5; 325 TABLET ORAL at 05:20

## 2020-12-05 NOTE — PHYSICAL THERAPY INITIAL EVALUATION ADULT - PERTINENT HX OF CURRENT PROBLEM, REHAB EVAL
pt stepped on a metal object 1 month ago, presented to ED and found to have a partial tear of plantar fascia and a metallic foreign body.

## 2020-12-05 NOTE — DISCHARGE NOTE PROVIDER - CARE PROVIDER_API CALL
Miguel Corcoran)  Geriatric Medicine; Internal Medicine  [address], [place] [id number]  Phone: [phone]  Fax: [phone]  Follow Up Time:     Shaan Johnson)  [facility]  [address] [facility] 1st [address]  [place] [address]  Phone: [phone]  Fax: [phone]  Follow Up Time:

## 2020-12-05 NOTE — DISCHARGE NOTE PROVIDER - NSDCCPCAREPLAN_GEN_ALL_CORE_FT
PRINCIPAL DISCHARGE DIAGNOSIS  Diagnosis: Foreign body in foot  Assessment and Plan of Treatment: Your foot was evaluated and operated on. You had a peice of plastic stuck which was removed. You have since remained stable, and are cleared to return home with outpatient podiatry follow up. Please also follow up with Dr. Corcoran for further evaluation and management.

## 2020-12-05 NOTE — PROGRESS NOTE ADULT - SUBJECTIVE AND OBJECTIVE BOX
SUBJECTIVE:    Patient is a 45y old Female who presents with a chief complaint of L foot pain (05 Dec 2020 11:32)    Currently admitted to medicine with the primary diagnosis of Foreign body in foot       24 hour update:  Today is hospital day 2d. This morning she is resting comfortably in bed and reports no new issues or overnight events.      PAST MEDICAL & SURGICAL HISTORY  IBS (irritable bowel syndrome)    S/P     Scalp mass  removal of scalp mass       SOCIAL HISTORY:    ALLERGIES:  No Known Allergies    MEDICATIONS:  STANDING MEDICATIONS  ceFAZolin   IVPB 1000 milliGRAM(s) IV Intermittent every 8 hours  chlorhexidine 4% Liquid 1 Application(s) Topical <User Schedule>  enoxaparin Injectable 40 milliGRAM(s) SubCutaneous daily  pantoprazole    Tablet 40 milliGRAM(s) Oral before breakfast    PRN MEDICATIONS  oxycodone    5 mG/acetaminophen 325 mG 1 Tablet(s) Oral every 6 hours PRN    VITALS:   T(F): 96.9  HR: 82  BP: 138/80  RR: 18  SpO2: 96%    PHYSICAL EXAM:  GENERAL: NAD, well-developed, AAOx3  HEENT:  Atraumatic, Normocephalic. EOMI, PERRLA, conjunctiva and sclera clear, No JVD  PULMONARY: Clear to auscultation bilaterally; No wheeze  CARDIOVASCULAR: Regular rate and rhythm; No murmurs, rubs, or gallops  GASTROINTESTINAL: Soft, Nontender, Nondistended; Bowel sounds present  MUSCULOSKELETAL:  2+ Peripheral Pulses, No clubbing, cyanosis, or edema  NEUROLOGY: non-focal  SKIN: on the  plantar aspect of L foot there is scaly desquamating skin around the wound, there is a yellow circular discoloration on plantar aspect of L foot    LABS:                        11.2   8.02  )-----------( 229      ( 05 Dec 2020 05:47 )             36.4     140  |  106  |  13  ----------------------------<  120<H>  4.1   |  25  |  0.7    Ca    8.0<L>      05 Dec 2020 05:47  Phos  3.7     12-03  Mg     2.0     12-05    TPro  6.0  /  Alb  3.8  /  TBili  <0.2  /  DBili  x   /  AST  32  /  ALT  34  /  AlkPhos  80  12-05    PT/INR - ( 03 Dec 2020 15:25 )   PT: 12.10 sec;   INR: 1.05 ratio    PTT - ( 03 Dec 2020 15:25 )  PTT:30.2 sec    Urinalysis Basic - ( 04 Dec 2020 06:30 )  Color: Yellow / Appearance: Clear / S.026 / pH: x  Gluc: x / Ketone: Negative  / Bili: Negative / Urobili: <2 mg/dL   Blood: x / Protein: Trace / Nitrite: Negative   Leuk Esterase: Negative / RBC: x / WBC x   Sq Epi: x / Non Sq Epi: x / Bacteria: x    Culture - Blood (collected 03 Dec 2020 21:25)  Source: .Blood Blood  Preliminary Report (05 Dec 2020 03:01):    No growth to date.    IMAGING:  MR Foot w/wo IV Cont, Left (20 @ 09:48)  Impression:   - Plantar midfoot soft tissue laceration with partial tear of the central cord of plantar fascia ; cellulitis without drainable collection or abscess formation  - Focus of metallic susceptibility artifact within the plantar midfoot skin, correlate clinically for metallic foreign body, consider nonweightbearing foot x-ray  - No MRI evidence of osteomyelitis    Xray Foot AP + Lateral + Oblique, Left (20 @ 16:30)  Impression:  Unremarkable left foot.

## 2020-12-05 NOTE — PROGRESS NOTE ADULT - SUBJECTIVE AND OBJECTIVE BOX
Podiatry Progress Note    Subjective:   GIOVANNY OTERO is a pleasant well-nourished, well developed 45y Female in no acute distress, alert awake, and oriented to person, place and time.  Patient is a 45y old  Female who presents with a chief complaint of L foot pain (04 Dec 2020 10:51)  Seen by bedside; doing well;     PAST MEDICAL & SURGICAL HISTORY:  IBS (irritable bowel syndrome)  S/P   Scalp mass  removal of scalp mass      Objective:  Vital Signs Last 24 Hrs  T(C): 36.1 (05 Dec 2020 05:15), Max: 37 (04 Dec 2020 13:00)  T(F): 96.9 (05 Dec 2020 05:15), Max: 98.6 (04 Dec 2020 13:00)  HR: 82 (05 Dec 2020 05:15) (62 - 82)  BP: 138/80 (05 Dec 2020 05:15) (117/77 - 138/80)  BP(mean): 87 (04 Dec 2020 13:00) (87 - 87)  RR: 18 (05 Dec 2020 05:15) (15 - 18)  SpO2: 96% (04 Dec 2020 21:00) (95% - 96%)                        11.2   8.02  )-----------( 229      ( 05 Dec 2020 05:47 )             36.4                 12-05    140  |  106  |  13  ----------------------------<  120<H>  4.1   |  25  |  0.7    Ca    8.0<L>      05 Dec 2020 05:47  Phos  3.7     12-03  Mg     2.0     12-05    TPro  6.0  /  Alb  3.8  /  TBili  <0.2  /  DBili  x   /  AST  32  /  ALT  34  /  AlkPhos  80  12-05    Physical Exam - Lower Extremity Focused:   Derm:   Left plantar midfoot surgical wound site; suture nicely intact, no active bleeding, no malodor, no drainage, no sign of infection, no ischemic changes on wound site;   Vascular: DP and PT Pulses Palpable;   Neuro: Protective Sensation Intact;   MSK: Pain On Palpation at Plantar Midfoot surgical wound site;     Assessment:   s/p foreign body removal - left foot     Plan:  Chart reviewed and Patient evaluated. All Questions and Concerns Addressed and Answered  Discussed diagnosis and treatment with patient  Wound Flushed w/ Normal saline; Xeroform / DSD / Kerlix / ACE  Local Wound Care; As Stated Above; q24  Podiatry will monitor left foot surgical wound site over the weekend;  Attending to round tomorrow,  to evaluate surgical wound site;   Weight bearing status; strict non-weight bearing to left foot;   Discussed Plan w/ Dr. Johnson    Podiatry      Podiatry Progress Note    Subjective:   GIOVANNY OTERO is a pleasant well-nourished, well developed 45y Female in no acute distress, alert awake, and oriented to person, place and time.  Patient is a 45y old  Female who presents with a chief complaint of L foot pain (04 Dec 2020 10:51)  Seen by bedside; doing well;     PAST MEDICAL & SURGICAL HISTORY:  IBS (irritable bowel syndrome)  S/P   Scalp mass  removal of scalp mass      Objective:  Vital Signs Last 24 Hrs  T(C): 36.1 (05 Dec 2020 05:15), Max: 37 (04 Dec 2020 13:00)  T(F): 96.9 (05 Dec 2020 05:15), Max: 98.6 (04 Dec 2020 13:00)  HR: 82 (05 Dec 2020 05:15) (62 - 82)  BP: 138/80 (05 Dec 2020 05:15) (117/77 - 138/80)  BP(mean): 87 (04 Dec 2020 13:00) (87 - 87)  RR: 18 (05 Dec 2020 05:15) (15 - 18)  SpO2: 96% (04 Dec 2020 21:00) (95% - 96%)                        11.2   8.02  )-----------( 229      ( 05 Dec 2020 05:47 )             36.4                 12-05    140  |  106  |  13  ----------------------------<  120<H>  4.1   |  25  |  0.7    Ca    8.0<L>      05 Dec 2020 05:47  Phos  3.7     12-03  Mg     2.0     12-05    TPro  6.0  /  Alb  3.8  /  TBili  <0.2  /  DBili  x   /  AST  32  /  ALT  34  /  AlkPhos  80  12-05    Physical Exam - Lower Extremity Focused:   Derm:   Left plantar midfoot surgical wound site; suture nicely intact, no active bleeding, no malodor, no drainage, no sign of infection, no ischemic changes on wound site;   Vascular: DP and PT Pulses Palpable;   Neuro: Protective Sensation Intact;   MSK: Pain On Palpation at Plantar Midfoot surgical wound site;     Assessment:   s/p foreign body removal - left foot     Plan:  Chart reviewed and Patient evaluated. All Questions and Concerns Addressed and Answered  Discussed diagnosis and treatment with patient  Wound Flushed w/ Normal saline; Xeroform / DSD / Kerlix / ACE  Local Wound Care; As Stated Above; q24  Patient is stable from podiatry standpoint; Patient can be discharged with current dressing;   Weight bearing status; strict non-weight bearing to left foot;   Follow up as an outpatient to Dr. Johnson at [address] [place] [address] [facility] a week post discharge;   Discussed Plan w/ Dr. Johnson    Podiatry

## 2020-12-05 NOTE — PROGRESS NOTE ADULT - ASSESSMENT
45F with PMH of HLD (controlled w/diet), prediabetes presented with sharp persistent 8/10 Left Plantar Midfoot pain that has been worsening over the last month. HPI dates back to 1 month ago when she slipped while stepping outside her house and had a metal piece penetrate the L foot. She received tetanus shot at that time and was advised to see podiatry Dr. Johnson who requested an MRI - Plantar midfoot soft tissue laceration with partial tear of the central cord of plantar fascia; cellulitis without drainable collection or abscess formation; along with a focus of metallic susceptibility artifact within the plantar midfoot skin, correlate clinically for metallic foreign body. Patient was advised to come to ED for antibiotics. In ED, vitals were stable, labwork unremarkable. Xray was neg. Podiatry was consulted and patient admitted for cellulitis.    #L foot cellulitis  - MRI: Plantar midfoot soft tissue laceration with partial tear of the central cord of plantar fascia ; cellulitis without drainable collection or abscess formation Focus of metallic susceptibility artifact within the plantar midfoot skin, correlate clinically for metallic foreign body, consider nonweightbearing foot x-ray  - X-RAY: unremarkable left foot findings  - cont IV cefazolin for now  - s/p OR with Podiatry 12/4 - removal of plastic foreign body  - tylenol, percocet prn for mild/mod and severe pain  - f/u podiatry recs    # Pre-Diabetes  - 12/4 serum glucose 96  - A1c 6.1   - follow up with outpatient PCP    # HLD  - labs on 12/4 TG, HDL, LDL are relatively normal  - follow up with lipid profile outpatient    GI ppx: pantoprazole  DVT ppx: lovenox  Activity: IAT  Diet: PO

## 2020-12-05 NOTE — DISCHARGE NOTE NURSING/CASE MANAGEMENT/SOCIAL WORK - PATIENT PORTAL LINK FT
You can access the FollowMyHealth Patient Portal offered by [facility] by registering at the following website: [email or web address]. By joining [employer]’s FollowMyHealth portal, you will also be able to view your health information using other applications (apps) compatible with our system.

## 2020-12-05 NOTE — DISCHARGE NOTE PROVIDER - HOSPITAL COURSE
45F with PMH of HLD (controlled w/diet), prediabetes presented with sharp persistent 8/10 Left Plantar Midfoot pain that has been worsening over the last month. HPI dates back to 1 month ago when she slipped while stepping outside her house and had a metal piece penetrate the L foot. She received tetanus shot at that time and was advised to see podiatry Dr. Johnson who requested an MRI - Plantar midfoot soft tissue laceration with partial tear of the central cord of plantar fascia; cellulitis without drainable collection or abscess formation; along with a focus of metallic susceptibility artifact within the plantar midfoot skin, correlate clinically for metallic foreign body. Patient was advised to come to ED for antibiotics. In ED, vitals were stable, labwork unremarkable. Xray was neg. Podiatry was consulted and patient admitted for cellulitis. During hospital stay patient was started on IV abx and taken to the OR of removal of foreign body. Patient has otherwise remained stable and medically cleared for discharge with no need for home abx as blood cultures were negative. Patient to follow up with medicine and podiatry outpatient. Patient agreeable with plan. 45F with PMH of HLD (controlled w/diet), prediabetes presented with sharp persistent 8/10 Left Plantar Midfoot pain that has been worsening over the last month. HPI dates back to 1 month ago when she slipped while stepping outside her house and had a metal piece penetrate the L foot. She received tetanus shot at that time and was advised to see podiatry Dr. Johnson who requested an MRI - Plantar midfoot soft tissue laceration with partial tear of the central cord of plantar fascia; cellulitis without drainable collection or abscess formation; along with a focus of metallic susceptibility artifact within the plantar midfoot skin, correlate clinically for metallic foreign body. Patient was advised to come to ED for antibiotics. In ED, vitals were stable, labwork unremarkable. Xray was neg. Podiatry was consulted and patient admitted for cellulitis. During hospital stay patient was started on IV abx and taken to the OR of removal of foreign body. Patient has otherwise remained stable and medically cleared for discharge. Patient to follow up with medicine and podiatry outpatient. Patient agreeable with plan.      WOUND CARE INSTRUCTIONS:  Clean wound with saline. Then apply xeroform followed by dry sterile gauze, kerlix wrap and finally an ACE wrap.     FOLLOW UP WITH DR. JOHNSON  ([address] [place] [address] [facility]) ONE WEEK AFTER DISCHARGE. CALL TO MAKE AN APPOINTMENT.

## 2020-12-05 NOTE — PROGRESS NOTE ADULT - ATTENDING COMMENTS
I saw and evaluated the patient. I have reviewed and agree with the findings and plan of care as documented above in the resident’s note (unless indicated differently below). Any necessary changes were made in the body of the text.    S:   C/o pain in the morning: moderate to severe, non-radiating, located at the site of the injury, aching, alleviated by analgesics    O:  Hemodynamics stable; afebrile; b/l air entry; no wheezes or rhonci; S1/S2; no RMG's; BS+; abd soft and nttp; LE's non-edematous  Labs and imaging studies reviewed    A:  S/p foreign body removal; no signs of infection at wound site    P:  Requested PT evaluation - to show the patient how to use crutches (NWB to LLE)  Case discussed w/ social work - referral made to home VNS services (if pt will qualify)  Patient educated on how to dress wound; instructed that she will need q24hr dressing changes  Instructed patient to take APAP PRN for pain control; will send a few tablets of oxycodone for severe pain (to be used sparingly)  Can d/c off abx; no signs of infection at the wound site  Instructed patient to f/u with Dr. Johnson at this clinic in one week. Address given and also listed in d/c papers.    D/c today

## 2020-12-05 NOTE — CHART NOTE - NSCHARTNOTEFT_GEN_A_CORE
Asked by Dr. Dominguez the attending on chart to send oxycodone 5 mg BID x 3 days for pain meds. Will prescribe for Dr. Dominguez as his authenticator is not set up yet. Pt ISTOPed and no suspicious activity.  Pt can follow up with pCP outpt

## 2020-12-08 ENCOUNTER — APPOINTMENT (OUTPATIENT)
Dept: PODIATRY | Facility: CLINIC | Age: 45
End: 2020-12-08

## 2020-12-09 LAB
CULTURE RESULTS: SIGNIFICANT CHANGE UP
SPECIMEN SOURCE: SIGNIFICANT CHANGE UP

## 2020-12-09 NOTE — ASSESSMENT
[FreeTextEntry1] : laceration plantar midfoot left\par Area requires surgery \par will report to ED \par Follow up as in patient

## 2020-12-10 ENCOUNTER — APPOINTMENT (OUTPATIENT)
Dept: INTERNAL MEDICINE | Facility: CLINIC | Age: 45
End: 2020-12-10
Payer: COMMERCIAL

## 2020-12-10 ENCOUNTER — OUTPATIENT (OUTPATIENT)
Dept: OUTPATIENT SERVICES | Facility: HOSPITAL | Age: 45
LOS: 1 days | Discharge: HOME | End: 2020-12-10

## 2020-12-10 ENCOUNTER — APPOINTMENT (OUTPATIENT)
Dept: PODIATRY | Facility: CLINIC | Age: 45
End: 2020-12-10
Payer: COMMERCIAL

## 2020-12-10 VITALS
DIASTOLIC BLOOD PRESSURE: 74 MMHG | TEMPERATURE: 97.9 F | BODY MASS INDEX: 42.28 KG/M2 | WEIGHT: 196 LBS | HEART RATE: 90 BPM | SYSTOLIC BLOOD PRESSURE: 124 MMHG | HEIGHT: 57 IN

## 2020-12-10 DIAGNOSIS — E78.5 HYPERLIPIDEMIA, UNSPECIFIED: ICD-10-CM

## 2020-12-10 DIAGNOSIS — R22.0 LOCALIZED SWELLING, MASS AND LUMP, HEAD: Chronic | ICD-10-CM

## 2020-12-10 DIAGNOSIS — Z98.891 HISTORY OF UTERINE SCAR FROM PREVIOUS SURGERY: Chronic | ICD-10-CM

## 2020-12-10 DIAGNOSIS — R73.03 PREDIABETES: ICD-10-CM

## 2020-12-10 DIAGNOSIS — D64.9 ANEMIA, UNSPECIFIED: ICD-10-CM

## 2020-12-10 DIAGNOSIS — S91.312A LACERATION WITHOUT FOREIGN BODY, LEFT FOOT, INITIAL ENCOUNTER: ICD-10-CM

## 2020-12-10 DIAGNOSIS — E66.9 OBESITY, UNSPECIFIED: ICD-10-CM

## 2020-12-10 PROCEDURE — 99213 OFFICE O/P EST LOW 20 MIN: CPT | Mod: GC

## 2020-12-10 PROCEDURE — 99213 OFFICE O/P EST LOW 20 MIN: CPT

## 2020-12-10 NOTE — HISTORY OF PRESENT ILLNESS
[FreeTextEntry1] : f/u s/p hospitalization for left foot laceration  [de-identified] : 45 Y/O F with PMH of obesity, HLD, pre DM, knee Osteoarthritis, scalp Schwannoma is here for the follow up s/p hospital admit for left foot lac with forighn body. Around 2months ago pt slipped while stepping outside her house and had a metal piece penetrate the L foot. She received tetanus shot at that time and was advised to see podiatry Dr. Johnson who requested an MRI -  cellulitis was seen with a metalic artifact.withinin the plantar midfoot skin. Patient was advised to come to ED for antibiotics. In ED, vitals were stable, labwork unremarkable. Xray was neg. Podiatry was  consulted and patient admitted for cellulitis. During hospital stay patient was started on IV abx and taken to the OR of removal of foreign body. She is here for a f/u with an appointment later today with pod.  \par \par Pt denies decrease sensation, or movement in the left foot, no f/c/n/v, or other systemic symptoms. \par

## 2020-12-10 NOTE — PHYSICAL EXAM
[No Acute Distress] : no acute distress [Well Nourished] : well nourished [Well-Appearing] : well-appearing [No JVD] : no jugular venous distention [No Lymphadenopathy] : no lymphadenopathy [Supple] : supple [No Respiratory Distress] : no respiratory distress  [No Accessory Muscle Use] : no accessory muscle use [Clear to Auscultation] : lungs were clear to auscultation bilaterally [Normal Rate] : normal rate  [Normal S1, S2] : normal S1 and S2 [No Murmur] : no murmur heard [Soft] : abdomen soft [Non Tender] : non-tender [Non-distended] : non-distended [No Joint Swelling] : no joint swelling [No Rash] : no rash [de-identified] : edema in the left leg, 2+, pitting, non tener

## 2020-12-10 NOTE — REVIEW OF SYSTEMS
[Fever] : no fever [Chills] : no chills [Fatigue] : no fatigue [Night Sweats] : no night sweats [Recent Change In Weight] : ~T no recent weight change [Chest Pain] : no chest pain [Leg Claudication] : no leg claudication [Lower Ext Edema] : no lower extremity edema [Orthopnea] : no orthopnea [Shortness Of Breath] : no shortness of breath [Wheezing] : no wheezing [Cough] : no cough [Abdominal Pain] : no abdominal pain [Nausea] : no nausea [Constipation] : no constipation [Diarrhea] : diarrhea [Vomiting] : no vomiting [Heartburn] : no heartburn [Joint Pain] : no joint pain [Muscle Pain] : no muscle pain [FreeTextEntry9] : Pain in the left foot, better with oxycodone and advil

## 2020-12-10 NOTE — ASSESSMENT
[FreeTextEntry1] : 45 Y/O F with PMH of obesity, Pre-DM, HLD, OA of B/L Knee and Scalp Schwannoma is here for the follow up.\par \par #Left foot lac with foreign body\par - s/p tetanus shot \par - s/p removal of object in the hospital \par - following with pod, has appointment later today\par - RTC PRN if systemic symptoms \par \par #Dyslipidemia \par -10 year ascvd risk =0.9%\par - no indication for statin therapy\par - consulted on life style modification\par - f/u at next appointment \par \par #prediabetic / obesity\par -hba1c = 6.1%\par - lifestyle modification for now. explained to patient she might need to be started on metformin. \par - explained the importance of weight loss and glucose control\par \par #Anemia\par - hbg 11~, stable\par - iron studies sent \par - f/u at next visit

## 2020-12-11 LAB — SURGICAL PATHOLOGY STUDY: SIGNIFICANT CHANGE UP

## 2020-12-17 ENCOUNTER — OUTPATIENT (OUTPATIENT)
Dept: OUTPATIENT SERVICES | Facility: HOSPITAL | Age: 45
LOS: 1 days | Discharge: HOME | End: 2020-12-17

## 2020-12-17 ENCOUNTER — TRANSCRIPTION ENCOUNTER (OUTPATIENT)
Age: 45
End: 2020-12-17

## 2020-12-17 ENCOUNTER — APPOINTMENT (OUTPATIENT)
Dept: PODIATRY | Facility: CLINIC | Age: 45
End: 2020-12-17
Payer: COMMERCIAL

## 2020-12-17 DIAGNOSIS — Z98.891 HISTORY OF UTERINE SCAR FROM PREVIOUS SURGERY: Chronic | ICD-10-CM

## 2020-12-17 DIAGNOSIS — R22.0 LOCALIZED SWELLING, MASS AND LUMP, HEAD: Chronic | ICD-10-CM

## 2020-12-17 PROCEDURE — 97760 ORTHOTIC MGMT&TRAING 1ST ENC: CPT

## 2020-12-22 ENCOUNTER — APPOINTMENT (OUTPATIENT)
Dept: PODIATRY | Facility: CLINIC | Age: 45
End: 2020-12-22
Payer: COMMERCIAL

## 2020-12-22 ENCOUNTER — OUTPATIENT (OUTPATIENT)
Dept: OUTPATIENT SERVICES | Facility: HOSPITAL | Age: 45
LOS: 1 days | Discharge: HOME | End: 2020-12-22

## 2020-12-22 DIAGNOSIS — R22.0 LOCALIZED SWELLING, MASS AND LUMP, HEAD: Chronic | ICD-10-CM

## 2020-12-22 DIAGNOSIS — Z98.891 HISTORY OF UTERINE SCAR FROM PREVIOUS SURGERY: Chronic | ICD-10-CM

## 2020-12-22 PROCEDURE — 29540 STRAPPING ANKLE &/FOOT: CPT | Mod: 58

## 2020-12-23 NOTE — ASSESSMENT
[FreeTextEntry1] : laceration plantar midfoot left\par Area appear clean and intact\par follow up in 1 week \par

## 2020-12-29 ENCOUNTER — APPOINTMENT (OUTPATIENT)
Dept: PODIATRY | Facility: CLINIC | Age: 45
End: 2020-12-29
Payer: COMMERCIAL

## 2020-12-29 ENCOUNTER — OUTPATIENT (OUTPATIENT)
Dept: OUTPATIENT SERVICES | Facility: HOSPITAL | Age: 45
LOS: 1 days | Discharge: HOME | End: 2020-12-29

## 2020-12-29 DIAGNOSIS — R22.0 LOCALIZED SWELLING, MASS AND LUMP, HEAD: Chronic | ICD-10-CM

## 2020-12-29 DIAGNOSIS — M79.672 PAIN IN LEFT FOOT: ICD-10-CM

## 2020-12-29 DIAGNOSIS — Z98.891 HISTORY OF UTERINE SCAR FROM PREVIOUS SURGERY: Chronic | ICD-10-CM

## 2020-12-29 DIAGNOSIS — S91.312A LACERATION W/OUT FOREIGN BODY, LEFT FOOT, INITIAL ENCOUNTER: ICD-10-CM

## 2020-12-29 PROCEDURE — 99024 POSTOP FOLLOW-UP VISIT: CPT

## 2020-12-30 ENCOUNTER — APPOINTMENT (OUTPATIENT)
Dept: OBGYN | Facility: CLINIC | Age: 45
End: 2020-12-30
Payer: COMMERCIAL

## 2020-12-30 ENCOUNTER — OUTPATIENT (OUTPATIENT)
Dept: OUTPATIENT SERVICES | Facility: HOSPITAL | Age: 45
LOS: 1 days | Discharge: HOME | End: 2020-12-30

## 2020-12-30 VITALS
SYSTOLIC BLOOD PRESSURE: 114 MMHG | HEIGHT: 57 IN | BODY MASS INDEX: 41.85 KG/M2 | DIASTOLIC BLOOD PRESSURE: 72 MMHG | WEIGHT: 194 LBS

## 2020-12-30 DIAGNOSIS — R73.03 PREDIABETES: ICD-10-CM

## 2020-12-30 DIAGNOSIS — S91.312A LACERATION WITHOUT FOREIGN BODY, LEFT FOOT, INITIAL ENCOUNTER: ICD-10-CM

## 2020-12-30 DIAGNOSIS — M79.672 PAIN IN LEFT FOOT: ICD-10-CM

## 2020-12-30 DIAGNOSIS — Z97.5 PRESENCE OF (INTRAUTERINE) CONTRACEPTIVE DEVICE: ICD-10-CM

## 2020-12-30 DIAGNOSIS — R22.0 LOCALIZED SWELLING, MASS AND LUMP, HEAD: Chronic | ICD-10-CM

## 2020-12-30 DIAGNOSIS — Z98.891 HISTORY OF UTERINE SCAR FROM PREVIOUS SURGERY: Chronic | ICD-10-CM

## 2020-12-30 PROCEDURE — 99396 PREV VISIT EST AGE 40-64: CPT

## 2020-12-30 NOTE — PHYSICAL EXAM
[1+] : left foot dorsalis pedis 1+ [Normal Foot/Ankle] : Both lower extremities were exposed and visualized. Standing exam demonstrates normal foot posture and alignment. Hindfoot exam shows no hindfoot valgus or varus [Oriented To Time, Place, And Person] : oriented to person, place, and time [Impaired Insight] : insight and judgment were intact [Ankle Swelling (On Exam)] : not present [Varicose Veins Of Lower Extremities] : not present [] : not present [Delayed in the Right Toes] : capillary refills normal in right toes [Delayed in the Left Toes] : capillary refills normal in the left toes [FreeTextEntry1] : Sutures Intact; Mild Wound on the Distal Aspect of Surgical Site;\par Red Granular Wound Base w/ Mildly Macerated Periwound;\par Skin Edges Coapted Throughout; Proximal to Wound;  [Vibration Dec.] : normal vibratory sensation at the level of the toes [Position Sense Dec.] : normal position sense at the level of the toes [Diminished Throughout Right Foot] : normal sensation with monofilament testing throughout right foot [Diminished Throughout Left Foot] : normal sensation with monofilament testing throughout left foot

## 2020-12-30 NOTE — HISTORY OF PRESENT ILLNESS
[Patient reported PAP Smear was normal] : Patient reported PAP Smear was normal [Y] : Patient uses contraception [Mammogramdate] : 2020 [PapSmeardate] : 2017 [FreeTextEntry1] : condom

## 2020-12-30 NOTE — PROCEDURE
[] : A mold was applied. [FreeTextEntry1] : s/p Foreign Body Removal; 12/5;\par Sutures Intact; Mild Wound @ Distal Aspect of Suture Site;\par \par Patient was seen and evaluated.\par Sutures Removed; Applied Xeroform / DSD / Kerlix;\par Advised to continue w/ Local wound care;\par Advised to follow up w/ Neftali for possible Shoe modification for offloading the surgical site;\par Advised to return back to clinic in 1 week;

## 2020-12-30 NOTE — PROCEDURE
[] : A mold was applied. [FreeTextEntry1] : wound clean, no drainage, sutures intact. redressed with xeroform,2x2,omnifix. orthotic fabricated for left darco shoe to off load mid foot wound site. pt continues to ambulate with walker essentially non weight bearing. orthotic will come in help with standing. pt to return next week to see podiatry.

## 2021-01-05 LAB — HPV HIGH+LOW RISK DNA PNL CVX: NOT DETECTED

## 2021-01-08 LAB — CYTOLOGY CVX/VAG DOC THIN PREP: NORMAL

## 2021-01-22 ENCOUNTER — OUTPATIENT (OUTPATIENT)
Dept: OUTPATIENT SERVICES | Facility: HOSPITAL | Age: 46
LOS: 1 days | Discharge: HOME | End: 2021-01-22

## 2021-01-22 DIAGNOSIS — R22.0 LOCALIZED SWELLING, MASS AND LUMP, HEAD: Chronic | ICD-10-CM

## 2021-01-22 DIAGNOSIS — Z98.891 HISTORY OF UTERINE SCAR FROM PREVIOUS SURGERY: Chronic | ICD-10-CM

## 2021-01-25 DIAGNOSIS — K02.62 DENTAL CARIES ON SMOOTH SURFACE PENETRATING INTO DENTIN: ICD-10-CM

## 2021-01-27 ENCOUNTER — APPOINTMENT (OUTPATIENT)
Dept: PODIATRY | Facility: CLINIC | Age: 46
End: 2021-01-27

## 2021-01-29 ENCOUNTER — OUTPATIENT (OUTPATIENT)
Dept: OUTPATIENT SERVICES | Facility: HOSPITAL | Age: 46
LOS: 1 days | Discharge: HOME | End: 2021-01-29

## 2021-01-29 DIAGNOSIS — R22.0 LOCALIZED SWELLING, MASS AND LUMP, HEAD: Chronic | ICD-10-CM

## 2021-01-29 DIAGNOSIS — Z98.891 HISTORY OF UTERINE SCAR FROM PREVIOUS SURGERY: Chronic | ICD-10-CM

## 2021-01-29 DIAGNOSIS — K02.62 DENTAL CARIES ON SMOOTH SURFACE PENETRATING INTO DENTIN: ICD-10-CM

## 2021-02-04 ENCOUNTER — APPOINTMENT (OUTPATIENT)
Dept: PODIATRY | Facility: CLINIC | Age: 46
End: 2021-02-04

## 2021-03-08 ENCOUNTER — OUTPATIENT (OUTPATIENT)
Dept: OUTPATIENT SERVICES | Facility: HOSPITAL | Age: 46
LOS: 1 days | Discharge: HOME | End: 2021-03-08

## 2021-03-08 ENCOUNTER — APPOINTMENT (OUTPATIENT)
Dept: PODIATRY | Facility: CLINIC | Age: 46
End: 2021-03-08
Payer: COMMERCIAL

## 2021-03-08 DIAGNOSIS — Z98.891 HISTORY OF UTERINE SCAR FROM PREVIOUS SURGERY: Chronic | ICD-10-CM

## 2021-03-08 DIAGNOSIS — R22.0 LOCALIZED SWELLING, MASS AND LUMP, HEAD: Chronic | ICD-10-CM

## 2021-03-08 PROCEDURE — 99213 OFFICE O/P EST LOW 20 MIN: CPT | Mod: 25,24

## 2021-03-08 PROCEDURE — 11900 INJECT SKIN LESIONS </W 7: CPT

## 2021-03-11 ENCOUNTER — APPOINTMENT (OUTPATIENT)
Dept: INTERNAL MEDICINE | Facility: CLINIC | Age: 46
End: 2021-03-11

## 2021-03-17 PROBLEM — M79.672 LEFT FOOT PAIN: Status: ACTIVE | Noted: 2020-10-30

## 2021-03-17 PROBLEM — S91.312A LACERATION OF LEFT FOOT: Status: ACTIVE | Noted: 2020-11-12

## 2021-03-17 NOTE — PROCEDURE
[] : A mold was applied. [FreeTextEntry1] : injection performed with 1% lidocaine and Betamethasone for a total of 1.5cc. Injection performed under sterile technique left foot at scar tissue \par \par

## 2021-03-17 NOTE — PHYSICAL EXAM
[Ankle Swelling (On Exam)] : not present [Varicose Veins Of Lower Extremities] : not present [Delayed in the Right Toes] : capillary refills normal in right toes [Delayed in the Left Toes] : capillary refills normal in the left toes [1+] : left foot dorsalis pedis 1+ [] : normal gait [Normal Foot/Ankle] : Both lower extremities were exposed and visualized. Standing exam demonstrates normal foot posture and alignment. Hindfoot exam shows no hindfoot valgus or varus [FreeTextEntry1] : Sutures Intact; Mild Wound on the Distal Aspect of Surgical Site;\par Red Granular Wound Base w/ Mildly Macerated Periwound;\par Skin Edges Coapted Throughout; Proximal to Wound;  [Vibration Dec.] : normal vibratory sensation at the level of the toes [Position Sense Dec.] : normal position sense at the level of the toes [Diminished Throughout Right Foot] : normal sensation with monofilament testing throughout right foot [Diminished Throughout Left Foot] : normal sensation with monofilament testing throughout left foot [Oriented To Time, Place, And Person] : oriented to person, place, and time [Impaired Insight] : insight and judgment were intact

## 2021-03-18 DIAGNOSIS — R52 PAIN, UNSPECIFIED: ICD-10-CM

## 2021-03-18 DIAGNOSIS — M79.672 PAIN IN LEFT FOOT: ICD-10-CM

## 2021-03-22 ENCOUNTER — APPOINTMENT (OUTPATIENT)
Dept: PODIATRY | Facility: CLINIC | Age: 46
End: 2021-03-22

## 2021-04-19 ENCOUNTER — APPOINTMENT (OUTPATIENT)
Dept: INTERNAL MEDICINE | Facility: CLINIC | Age: 46
End: 2021-04-19

## 2021-04-23 ENCOUNTER — OUTPATIENT (OUTPATIENT)
Dept: OUTPATIENT SERVICES | Facility: HOSPITAL | Age: 46
LOS: 1 days | Discharge: HOME | End: 2021-04-23

## 2021-04-23 ENCOUNTER — APPOINTMENT (OUTPATIENT)
Dept: PULMONOLOGY | Facility: CLINIC | Age: 46
End: 2021-04-23
Payer: COMMERCIAL

## 2021-04-23 VITALS
SYSTOLIC BLOOD PRESSURE: 120 MMHG | OXYGEN SATURATION: 96 % | HEART RATE: 82 BPM | HEIGHT: 57 IN | BODY MASS INDEX: 42.72 KG/M2 | WEIGHT: 198 LBS | TEMPERATURE: 97.5 F | DIASTOLIC BLOOD PRESSURE: 71 MMHG

## 2021-04-23 DIAGNOSIS — Z98.891 HISTORY OF UTERINE SCAR FROM PREVIOUS SURGERY: Chronic | ICD-10-CM

## 2021-04-23 DIAGNOSIS — R22.0 LOCALIZED SWELLING, MASS AND LUMP, HEAD: Chronic | ICD-10-CM

## 2021-04-23 PROCEDURE — 99203 OFFICE O/P NEW LOW 30 MIN: CPT | Mod: GC

## 2021-04-23 NOTE — HISTORY OF PRESENT ILLNESS
[Never] : never [TextBox_4] : 44Y/O F with PMH of obesity, knee Osteoarthritis, scalp Schwannoma is here to establish care. Patient reports feeling sleepy during the day and tiredness as well. she reports she needs to nap during to day. \par \par

## 2021-04-23 NOTE — PHYSICAL EXAM
[No Acute Distress] : no acute distress [III] : Mallampati Class: III [Supple] : supple [Normal Rate/Rhythm] : normal rate/rhythm [Normal S1, S2] : normal s1, s2 [No Resp Distress] : no resp distress [Clear to Auscultation Bilaterally] : clear to auscultation bilaterally [No Abnormalities] : no abnormalities [Benign] : benign [No Focal Deficits] : no focal deficits [Oriented x3] : oriented x3

## 2021-04-23 NOTE — ASSESSMENT
[FreeTextEntry1] : #Suspected Sleep apnea\par - send for sleep study\par - Patient is obese with Mallampati score 3\par rtc 6 weeks s/p sleep study

## 2021-05-12 ENCOUNTER — LABORATORY RESULT (OUTPATIENT)
Age: 46
End: 2021-05-12

## 2021-05-12 ENCOUNTER — OUTPATIENT (OUTPATIENT)
Dept: OUTPATIENT SERVICES | Facility: HOSPITAL | Age: 46
LOS: 1 days | Discharge: HOME | End: 2021-05-12

## 2021-05-12 DIAGNOSIS — Z98.891 HISTORY OF UTERINE SCAR FROM PREVIOUS SURGERY: Chronic | ICD-10-CM

## 2021-05-12 DIAGNOSIS — Z11.59 ENCOUNTER FOR SCREENING FOR OTHER VIRAL DISEASES: ICD-10-CM

## 2021-05-12 DIAGNOSIS — R22.0 LOCALIZED SWELLING, MASS AND LUMP, HEAD: Chronic | ICD-10-CM

## 2021-05-16 ENCOUNTER — OUTPATIENT (OUTPATIENT)
Dept: OUTPATIENT SERVICES | Facility: HOSPITAL | Age: 46
LOS: 1 days | Discharge: HOME | End: 2021-05-16
Payer: SUBSIDIZED

## 2021-05-16 DIAGNOSIS — R22.0 LOCALIZED SWELLING, MASS AND LUMP, HEAD: Chronic | ICD-10-CM

## 2021-05-16 DIAGNOSIS — Z98.891 HISTORY OF UTERINE SCAR FROM PREVIOUS SURGERY: Chronic | ICD-10-CM

## 2021-05-16 PROCEDURE — 95811 POLYSOM 6/>YRS CPAP 4/> PARM: CPT | Mod: 26

## 2021-05-17 DIAGNOSIS — G47.33 OBSTRUCTIVE SLEEP APNEA (ADULT) (PEDIATRIC): ICD-10-CM

## 2021-06-15 ENCOUNTER — OUTPATIENT (OUTPATIENT)
Dept: OUTPATIENT SERVICES | Facility: HOSPITAL | Age: 46
LOS: 1 days | Discharge: HOME | End: 2021-06-15

## 2021-06-15 DIAGNOSIS — Z98.891 HISTORY OF UTERINE SCAR FROM PREVIOUS SURGERY: Chronic | ICD-10-CM

## 2021-06-15 DIAGNOSIS — K02.63 DENTAL CARIES ON SMOOTH SURFACE PENETRATING INTO PULP: ICD-10-CM

## 2021-06-15 DIAGNOSIS — R22.0 LOCALIZED SWELLING, MASS AND LUMP, HEAD: Chronic | ICD-10-CM

## 2021-07-01 LAB
FERRITIN SERPL-MCNC: 35 NG/ML
IRON SATN MFR SERPL: 16 %
IRON SERPL-MCNC: 65 UG/DL
TIBC SERPL-MCNC: 399 UG/DL
TRANSFERRIN SERPL-MCNC: 335 MG/DL
UIBC SERPL-MCNC: 334 UG/DL

## 2021-07-23 ENCOUNTER — NON-APPOINTMENT (OUTPATIENT)
Age: 46
End: 2021-07-23

## 2021-07-23 ENCOUNTER — APPOINTMENT (OUTPATIENT)
Dept: PULMONOLOGY | Facility: CLINIC | Age: 46
End: 2021-07-23
Payer: COMMERCIAL

## 2021-07-23 ENCOUNTER — OUTPATIENT (OUTPATIENT)
Dept: OUTPATIENT SERVICES | Facility: HOSPITAL | Age: 46
LOS: 1 days | Discharge: HOME | End: 2021-07-23

## 2021-07-23 VITALS
BODY MASS INDEX: 42.93 KG/M2 | TEMPERATURE: 97.4 F | OXYGEN SATURATION: 96 % | SYSTOLIC BLOOD PRESSURE: 111 MMHG | HEART RATE: 66 BPM | DIASTOLIC BLOOD PRESSURE: 71 MMHG | HEIGHT: 57 IN | WEIGHT: 199 LBS

## 2021-07-23 DIAGNOSIS — R22.0 LOCALIZED SWELLING, MASS AND LUMP, HEAD: Chronic | ICD-10-CM

## 2021-07-23 DIAGNOSIS — Z00.00 ENCOUNTER FOR GENERAL ADULT MEDICAL EXAMINATION WITHOUT ABNORMAL FINDINGS: ICD-10-CM

## 2021-07-23 DIAGNOSIS — R06.83 SNORING: ICD-10-CM

## 2021-07-23 DIAGNOSIS — E66.9 OBESITY, UNSPECIFIED: ICD-10-CM

## 2021-07-23 DIAGNOSIS — G47.33 OBSTRUCTIVE SLEEP APNEA (ADULT) (PEDIATRIC): ICD-10-CM

## 2021-07-23 DIAGNOSIS — Z98.891 HISTORY OF UTERINE SCAR FROM PREVIOUS SURGERY: Chronic | ICD-10-CM

## 2021-07-23 PROCEDURE — 99213 OFFICE O/P EST LOW 20 MIN: CPT | Mod: GC

## 2021-07-23 NOTE — ASSESSMENT
[FreeTextEntry1] : # Sleep apnea\par - sent for sleep study on 05/28/21\par - Patient is obese with Mallampati score 3\par - sleep studies result shows AHI score of 75.7 which is consistent with severe sleep apnea \par - CPAP prescribed\par -F/u in 4 months

## 2021-07-23 NOTE — PHYSICAL EXAM
[No Acute Distress] : no acute distress [Enlarged Base of the Tongue] : enlarged base of the tongue [IV] : Mallampati Class: IV [Normal Appearance] : normal appearance [Normal Rate/Rhythm] : normal rate/rhythm [Normal S1, S2] : normal s1, s2 [No Murmurs] : no murmurs [No Resp Distress] : no resp distress [Clear to Auscultation Bilaterally] : clear to auscultation bilaterally [No Abnormalities] : no abnormalities [Benign] : benign [Normal Gait] : normal gait [No Clubbing] : no clubbing [Normal Color/ Pigmentation] : normal color/ pigmentation [No Focal Deficits] : no focal deficits [Oriented x3] : oriented x3

## 2021-09-02 ENCOUNTER — APPOINTMENT (OUTPATIENT)
Dept: INTERNAL MEDICINE | Facility: CLINIC | Age: 46
End: 2021-09-02
Payer: COMMERCIAL

## 2021-09-02 ENCOUNTER — OUTPATIENT (OUTPATIENT)
Dept: OUTPATIENT SERVICES | Facility: HOSPITAL | Age: 46
LOS: 1 days | Discharge: HOME | End: 2021-09-02

## 2021-09-02 ENCOUNTER — NON-APPOINTMENT (OUTPATIENT)
Age: 46
End: 2021-09-02

## 2021-09-02 VITALS
DIASTOLIC BLOOD PRESSURE: 71 MMHG | OXYGEN SATURATION: 96 % | HEART RATE: 92 BPM | HEIGHT: 57 IN | SYSTOLIC BLOOD PRESSURE: 122 MMHG | BODY MASS INDEX: 42.72 KG/M2 | WEIGHT: 198 LBS | TEMPERATURE: 97.2 F

## 2021-09-02 DIAGNOSIS — E78.5 HYPERLIPIDEMIA, UNSPECIFIED: ICD-10-CM

## 2021-09-02 DIAGNOSIS — R22.0 LOCALIZED SWELLING, MASS AND LUMP, HEAD: Chronic | ICD-10-CM

## 2021-09-02 DIAGNOSIS — Z00.00 ENCOUNTER FOR GENERAL ADULT MEDICAL EXAMINATION WITHOUT ABNORMAL FINDINGS: ICD-10-CM

## 2021-09-02 DIAGNOSIS — Z98.891 HISTORY OF UTERINE SCAR FROM PREVIOUS SURGERY: Chronic | ICD-10-CM

## 2021-09-02 DIAGNOSIS — R73.03 PREDIABETES: ICD-10-CM

## 2021-09-02 DIAGNOSIS — D64.9 ANEMIA, UNSPECIFIED: ICD-10-CM

## 2021-09-02 DIAGNOSIS — M25.511 PAIN IN RIGHT SHOULDER: ICD-10-CM

## 2021-09-02 DIAGNOSIS — K21.9 GASTRO-ESOPHAGEAL REFLUX DISEASE WITHOUT ESOPHAGITIS: ICD-10-CM

## 2021-09-02 PROCEDURE — 99214 OFFICE O/P EST MOD 30 MIN: CPT

## 2021-09-02 RX ORDER — OXYCODONE AND ACETAMINOPHEN 5; 325 MG/1; MG/1
5-325 TABLET ORAL
Qty: 16 | Refills: 0 | Status: DISCONTINUED | COMMUNITY
Start: 2020-10-30 | End: 2021-09-02

## 2021-09-02 RX ORDER — CEPHALEXIN 500 MG/1
500 CAPSULE ORAL 4 TIMES DAILY
Qty: 28 | Refills: 0 | Status: DISCONTINUED | COMMUNITY
Start: 2020-10-26 | End: 2021-09-02

## 2021-09-02 RX ORDER — SULFAMETHOXAZOLE AND TRIMETHOPRIM 800; 160 MG/1; MG/1
800-160 TABLET ORAL TWICE DAILY
Qty: 28 | Refills: 0 | Status: DISCONTINUED | COMMUNITY
Start: 2020-10-30 | End: 2021-09-02

## 2021-09-02 NOTE — PHYSICAL EXAM
[No Acute Distress] : no acute distress [Normal Sclera/Conjunctiva] : normal sclera/conjunctiva [PERRL] : pupils equal round and reactive to light [Normal Outer Ear/Nose] : the outer ears and nose were normal in appearance [Normal Oropharynx] : the oropharynx was normal [No JVD] : no jugular venous distention [Supple] : supple [No Respiratory Distress] : no respiratory distress  [No Accessory Muscle Use] : no accessory muscle use [Normal Rate] : normal rate  [Regular Rhythm] : with a regular rhythm [No Carotid Bruits] : no carotid bruits [No Varicosities] : no varicosities [Pedal Pulses Present] : the pedal pulses are present [No Edema] : there was no peripheral edema [Soft] : abdomen soft [Non Tender] : non-tender [Grossly Normal Strength/Tone] : grossly normal strength/tone [de-identified] : Obese  [de-identified] : Normal ROM in B/l UE. 5/5 strength

## 2021-09-02 NOTE — REVIEW OF SYSTEMS
[Hot Flashes] : hot flashes [Heartburn] : heartburn [Discharge] : no discharge [Pain] : no pain [Vision Problems] : no vision problems [Itching] : no itching [Earache] : no earache [Nosebleed] : no nosebleeds [Nasal Discharge] : no nasal discharge [Sore Throat] : no sore throat [Chest Pain] : no chest pain [Palpitations] : no palpitations [Leg Claudication] : no leg claudication [Shortness Of Breath] : no shortness of breath [Wheezing] : no wheezing [Cough] : no cough [Dysuria] : no dysuria [Incontinence] : no incontinence [FreeTextEntry9] : R arm pain

## 2021-09-02 NOTE — ASSESSMENT
[FreeTextEntry1] : 43 Y/O F with PMH of obesity, Pre-DM, HLD, OA of B/L Knee and Scalp Schwannoma is here for the follow up.\par \par # Acid Reflux\par - hx of reflux\par -alleviated with omeprazole \par -explained to pt importance of weight loss and foods to avoid. \par \par # R shoulder pain\par -acute - 1 week duration\par - on physical exam normal ROM\par - Tylenol prn, physical activity \par -Return PRN if no improvement \par \par #Dyslipidemia \par -10 year ascvd risk =0.9%\par - no indication for statin therapy-\par - on July 2020\par - consulted on life style modification\par - ordred Lipid profile today \par \par #prediabetic / obesity\par -hba1c = 6.3% (July 2020)\par - lifestyle modification for now. explained to patient she might need to be started on metformin. \par - explained the importance of weight loss and glucose control\par -ordered a1c today \par \par # Normacytic Anemia\par - hbg 11~, stable (July 2020)\par - iron studies wnl \par - repeat CBC today \par \par # Sleep apnea\par - sent for sleep study on 05/28/21\par - Patient is obese with Mallampati score 3\par - sleep studies result shows AHI score of 75.7 which is consistent with severe sleep apnea \par - CPAP prescribed- pt insurance don’t cover \par - Follow[facility] with Dr Aguilar [facility] \par \par #HCM\par -last pap Dec 2020- negative \par -CBC, CMP, TSH, VitD \par -Return in 6 months or PRN

## 2021-09-02 NOTE — HISTORY OF PRESENT ILLNESS
[FreeTextEntry1] : Follow up visit  [de-identified] : 44 yo F with PMH of obesity, newly diagnosed sleep apnea, acid reflux ,HLD, pre DM, knee Osteoarthritis, scalp Schwannoma is here for the follow up. Reports epigastric discomfort accompanied by heartburn. States that it happens during the day and night. Prilosec helps with discomfort. \par Denies change in bowel movement, hematochezia, and melena. Denies recent change in weight. \par \par Also reports r shoulder pain 5/10 that radiates down right arm .Started 1 week ago. No aggravating or alleviating factors. \par \par Of note, in [place] she was told that she had gallstones.

## 2021-11-04 LAB
25(OH)D3 SERPL-MCNC: 18 NG/ML
ALBUMIN SERPL ELPH-MCNC: 4.6 G/DL
ALP BLD-CCNC: 109 U/L
ALT SERPL-CCNC: 31 U/L
ANION GAP SERPL CALC-SCNC: 11 MMOL/L
AST SERPL-CCNC: 22 U/L
BASOPHILS # BLD AUTO: 0.04 K/UL
BASOPHILS NFR BLD AUTO: 0.5 %
BILIRUB SERPL-MCNC: 0.3 MG/DL
BUN SERPL-MCNC: 11 MG/DL
CALCIUM SERPL-MCNC: 9 MG/DL
CHLORIDE SERPL-SCNC: 104 MMOL/L
CHOLEST SERPL-MCNC: 180 MG/DL
CO2 SERPL-SCNC: 25 MMOL/L
CREAT SERPL-MCNC: 0.6 MG/DL
EOSINOPHIL # BLD AUTO: 0.2 K/UL
EOSINOPHIL NFR BLD AUTO: 2.6 %
ESTIMATED AVERAGE GLUCOSE: 128 MG/DL
GLUCOSE SERPL-MCNC: 84 MG/DL
HBA1C MFR BLD HPLC: 6.1 %
HCT VFR BLD CALC: 40.4 %
HDLC SERPL-MCNC: 57 MG/DL
HGB BLD-MCNC: 13 G/DL
IMM GRANULOCYTES NFR BLD AUTO: 0.4 %
LDLC SERPL CALC-MCNC: 105 MG/DL
LYMPHOCYTES # BLD AUTO: 1.63 K/UL
LYMPHOCYTES NFR BLD AUTO: 21 %
MAN DIFF?: NORMAL
MCHC RBC-ENTMCNC: 26.1 PG
MCHC RBC-ENTMCNC: 32.2 G/DL
MCV RBC AUTO: 81 FL
MONOCYTES # BLD AUTO: 0.53 K/UL
MONOCYTES NFR BLD AUTO: 6.8 %
NEUTROPHILS # BLD AUTO: 5.34 K/UL
NEUTROPHILS NFR BLD AUTO: 68.7 %
NONHDLC SERPL-MCNC: 123 MG/DL
PLATELET # BLD AUTO: 275 K/UL
POTASSIUM SERPL-SCNC: 4.1 MMOL/L
PROT SERPL-MCNC: 7.4 G/DL
RBC # BLD: 4.99 M/UL
RBC # FLD: 14.3 %
SODIUM SERPL-SCNC: 140 MMOL/L
TRIGL SERPL-MCNC: 84 MG/DL
TSH SERPL-ACNC: 0.89 UIU/ML
WBC # FLD AUTO: 7.77 K/UL

## 2021-11-26 ENCOUNTER — APPOINTMENT (OUTPATIENT)
Dept: PULMONOLOGY | Facility: CLINIC | Age: 46
End: 2021-11-26

## 2022-01-05 ENCOUNTER — OUTPATIENT (OUTPATIENT)
Dept: OUTPATIENT SERVICES | Facility: HOSPITAL | Age: 47
LOS: 1 days | Discharge: HOME | End: 2022-01-05

## 2022-01-05 ENCOUNTER — APPOINTMENT (OUTPATIENT)
Dept: OBGYN | Facility: CLINIC | Age: 47
End: 2022-01-05
Payer: COMMERCIAL

## 2022-01-05 VITALS
SYSTOLIC BLOOD PRESSURE: 122 MMHG | WEIGHT: 201 LBS | BODY MASS INDEX: 43.36 KG/M2 | HEIGHT: 57 IN | DIASTOLIC BLOOD PRESSURE: 60 MMHG

## 2022-01-05 DIAGNOSIS — Z98.891 HISTORY OF UTERINE SCAR FROM PREVIOUS SURGERY: Chronic | ICD-10-CM

## 2022-01-05 DIAGNOSIS — R22.0 LOCALIZED SWELLING, MASS AND LUMP, HEAD: Chronic | ICD-10-CM

## 2022-01-05 PROCEDURE — 99396 PREV VISIT EST AGE 40-64: CPT

## 2022-01-05 NOTE — HISTORY OF PRESENT ILLNESS
[FreeTextEntry1] : 45 y/o female for annual No complaints [Patient reported mammogram was normal] : Patient reported mammogram was normal [Patient reported PAP Smear was normal] : Patient reported PAP Smear was normal [Y] : Patient uses contraception [Condoms] : uses condoms [Mammogramdate] : 2020 [PapSmeardate] : 2020

## 2022-01-22 ENCOUNTER — OUTPATIENT (OUTPATIENT)
Dept: OUTPATIENT SERVICES | Facility: HOSPITAL | Age: 47
LOS: 1 days | Discharge: HOME | End: 2022-01-22
Payer: OTHER GOVERNMENT

## 2022-01-22 ENCOUNTER — RESULT REVIEW (OUTPATIENT)
Age: 47
End: 2022-01-22

## 2022-01-22 DIAGNOSIS — Z12.31 ENCOUNTER FOR SCREENING MAMMOGRAM FOR MALIGNANT NEOPLASM OF BREAST: ICD-10-CM

## 2022-01-22 DIAGNOSIS — Z98.891 HISTORY OF UTERINE SCAR FROM PREVIOUS SURGERY: Chronic | ICD-10-CM

## 2022-01-22 DIAGNOSIS — R22.0 LOCALIZED SWELLING, MASS AND LUMP, HEAD: Chronic | ICD-10-CM

## 2022-01-22 PROCEDURE — 77067 SCR MAMMO BI INCL CAD: CPT | Mod: 26

## 2022-01-22 PROCEDURE — 77063 BREAST TOMOSYNTHESIS BI: CPT | Mod: 26

## 2022-01-27 ENCOUNTER — OUTPATIENT (OUTPATIENT)
Dept: OUTPATIENT SERVICES | Facility: HOSPITAL | Age: 47
LOS: 1 days | Discharge: HOME | End: 2022-01-27

## 2022-01-27 ENCOUNTER — OUTPATIENT (OUTPATIENT)
Dept: OUTPATIENT SERVICES | Facility: HOSPITAL | Age: 47
LOS: 1 days | End: 2022-01-27

## 2022-01-27 DIAGNOSIS — Z98.891 HISTORY OF UTERINE SCAR FROM PREVIOUS SURGERY: Chronic | ICD-10-CM

## 2022-01-27 DIAGNOSIS — R22.0 LOCALIZED SWELLING, MASS AND LUMP, HEAD: Chronic | ICD-10-CM

## 2022-01-27 DIAGNOSIS — K02.63 DENTAL CARIES ON SMOOTH SURFACE PENETRATING INTO PULP: ICD-10-CM

## 2022-02-10 ENCOUNTER — OUTPATIENT (OUTPATIENT)
Dept: OUTPATIENT SERVICES | Facility: HOSPITAL | Age: 47
LOS: 1 days | Discharge: HOME | End: 2022-02-10

## 2022-02-10 DIAGNOSIS — R22.0 LOCALIZED SWELLING, MASS AND LUMP, HEAD: Chronic | ICD-10-CM

## 2022-02-10 DIAGNOSIS — Z98.891 HISTORY OF UTERINE SCAR FROM PREVIOUS SURGERY: Chronic | ICD-10-CM

## 2022-02-10 DIAGNOSIS — K02.63 DENTAL CARIES ON SMOOTH SURFACE PENETRATING INTO PULP: ICD-10-CM

## 2022-08-04 ENCOUNTER — OUTPATIENT (OUTPATIENT)
Dept: OUTPATIENT SERVICES | Facility: HOSPITAL | Age: 47
LOS: 1 days | End: 2022-08-04

## 2022-08-04 DIAGNOSIS — K02.63 DENTAL CARIES ON SMOOTH SURFACE PENETRATING INTO PULP: ICD-10-CM

## 2022-08-04 DIAGNOSIS — R22.0 LOCALIZED SWELLING, MASS AND LUMP, HEAD: Chronic | ICD-10-CM

## 2022-08-04 DIAGNOSIS — Z98.891 HISTORY OF UTERINE SCAR FROM PREVIOUS SURGERY: Chronic | ICD-10-CM

## 2022-08-19 ENCOUNTER — APPOINTMENT (OUTPATIENT)
Dept: INTERNAL MEDICINE | Facility: CLINIC | Age: 47
End: 2022-08-19

## 2022-08-19 ENCOUNTER — NON-APPOINTMENT (OUTPATIENT)
Age: 47
End: 2022-08-19

## 2022-08-19 ENCOUNTER — OUTPATIENT (OUTPATIENT)
Dept: OUTPATIENT SERVICES | Facility: HOSPITAL | Age: 47
LOS: 1 days | Discharge: HOME | End: 2022-08-19

## 2022-08-19 VITALS
HEART RATE: 88 BPM | OXYGEN SATURATION: 94 % | BODY MASS INDEX: 42.93 KG/M2 | TEMPERATURE: 97.3 F | SYSTOLIC BLOOD PRESSURE: 111 MMHG | WEIGHT: 199 LBS | HEIGHT: 57 IN | DIASTOLIC BLOOD PRESSURE: 75 MMHG

## 2022-08-19 DIAGNOSIS — E66.9 OBESITY, UNSPECIFIED: ICD-10-CM

## 2022-08-19 DIAGNOSIS — Z98.891 HISTORY OF UTERINE SCAR FROM PREVIOUS SURGERY: Chronic | ICD-10-CM

## 2022-08-19 DIAGNOSIS — R22.0 LOCALIZED SWELLING, MASS AND LUMP, HEAD: Chronic | ICD-10-CM

## 2022-08-19 DIAGNOSIS — R73.03 PREDIABETES.: ICD-10-CM

## 2022-08-19 DIAGNOSIS — E55.9 VITAMIN D DEFICIENCY, UNSPECIFIED: ICD-10-CM

## 2022-08-19 DIAGNOSIS — E78.5 HYPERLIPIDEMIA, UNSPECIFIED: ICD-10-CM

## 2022-08-19 DIAGNOSIS — H11.001 UNSPECIFIED PTERYGIUM OF RIGHT EYE: ICD-10-CM

## 2022-08-19 PROCEDURE — 99214 OFFICE O/P EST MOD 30 MIN: CPT | Mod: GC

## 2022-08-19 NOTE — HISTORY OF PRESENT ILLNESS
[FreeTextEntry1] : follow up  [de-identified] : 45 yo F with PMH of obesity, PAMELA, GERD ,HLD, pre DM, knee Osteoarthritis, scalp Schwannoma is here for the follow up. \par \par Patient presents with R eye pain and visual disturbance. States that this started about 2 months ago, has not experienced any occular trauma that she knows of. States that she has discomfort with EOM, but not severe. Denies pruritus, but is irritated throughout the day. States she has tried cleareyes but didn’t help. \par \par Also states that she has been experiencing GERD symptoms. She states that she was told in [place] that she has gastritis. Was given prilosec. States that when she takes it her symptoms greatly improve, but she has not taken it for a long time. \par \par Otherwise no complaints at this time.

## 2022-08-19 NOTE — PHYSICAL EXAM
[No Acute Distress] : no acute distress [Well Nourished] : well nourished [Well Developed] : well developed [Well-Appearing] : well-appearing [EOMI] : extraocular movements intact [Normal Outer Ear/Nose] : the outer ears and nose were normal in appearance [Normal Oropharynx] : the oropharynx was normal [No JVD] : no jugular venous distention [No Lymphadenopathy] : no lymphadenopathy [Supple] : supple [No Respiratory Distress] : no respiratory distress  [No Accessory Muscle Use] : no accessory muscle use [Clear to Auscultation] : lungs were clear to auscultation bilaterally [Normal Rate] : normal rate  [Regular Rhythm] : with a regular rhythm [Normal S1, S2] : normal S1 and S2 [No Murmur] : no murmur heard [No Edema] : there was no peripheral edema [Soft] : abdomen soft [Non Tender] : non-tender [Non-distended] : non-distended [No HSM] : no HSM [Normal Bowel Sounds] : normal bowel sounds [No CVA Tenderness] : no CVA  tenderness [No Spinal Tenderness] : no spinal tenderness [Grossly Normal Strength/Tone] : grossly normal strength/tone [No Rash] : no rash [Coordination Grossly Intact] : coordination grossly intact [No Focal Deficits] : no focal deficits [Normal Gait] : normal gait [Normal Affect] : the affect was normal [Normal Insight/Judgement] : insight and judgment were intact [de-identified] : Sclera spreading into the cornea in wedge shape

## 2022-08-19 NOTE — ASSESSMENT
[FreeTextEntry1] : #pterygium of the R eye\par - does not cross midline\par - opthalmology eval \par \par #Vitamin D deficiency \par - start vitamin D tabs \par - f/u repeat \par \par # GERD\par - hx of reflux, was given prilosec in the past \par -explained to pt importance of weight loss and foods to avoid\par - start 8 week trial of PPI\par - can follow GI as seeing for colonoscopy if need for EGD\par \par #prediabetic / obesity/ DLD\par - hba1c = 6.1% (Oct 2021)\par -  oct 2021\par - lifestyle modifications for now, healthy diet and exercise \par - explained the importance of weight loss and glucose control\par  - repeat A1c, lipid panel \par \par # PAMELA\par - sent for sleep study on 05/28/21\par - Patient is obese with Mallampati score 3\par - sleep studies result shows AHI score of 75.7 which is consistent with severe sleep apnea \par - CPAP prescribed- pt insurance don’t cover \par - Follows with Dr Aguilar in [facility] \par \par #HCM\par - last pap Dec 2020- negative \par - routine labs \par - Gi referral for colonoscopy \par - Return in 6 months or PRN

## 2022-08-22 DIAGNOSIS — K21.9 GASTRO-ESOPHAGEAL REFLUX DISEASE WITHOUT ESOPHAGITIS: ICD-10-CM

## 2022-08-22 DIAGNOSIS — H11.001 UNSPECIFIED PTERYGIUM OF RIGHT EYE: ICD-10-CM

## 2022-08-22 DIAGNOSIS — R73.03 PREDIABETES: ICD-10-CM

## 2022-08-22 DIAGNOSIS — E55.9 VITAMIN D DEFICIENCY, UNSPECIFIED: ICD-10-CM

## 2022-08-22 DIAGNOSIS — E66.9 OBESITY, UNSPECIFIED: ICD-10-CM

## 2022-08-22 DIAGNOSIS — E78.5 HYPERLIPIDEMIA, UNSPECIFIED: ICD-10-CM

## 2022-10-02 LAB
25(OH)D3 SERPL-MCNC: 22 NG/ML
ALBUMIN SERPL ELPH-MCNC: 4.4 G/DL
ALP BLD-CCNC: 98 U/L
ALT SERPL-CCNC: 30 U/L
ANION GAP SERPL CALC-SCNC: 10 MMOL/L
AST SERPL-CCNC: 18 U/L
BASOPHILS # BLD AUTO: 0.03 K/UL
BASOPHILS NFR BLD AUTO: 0.4 %
BILIRUB SERPL-MCNC: 0.2 MG/DL
BUN SERPL-MCNC: 9 MG/DL
CALCIUM SERPL-MCNC: 8.7 MG/DL
CHLORIDE SERPL-SCNC: 106 MMOL/L
CHOLEST SERPL-MCNC: 165 MG/DL
CO2 SERPL-SCNC: 23 MMOL/L
CREAT SERPL-MCNC: 0.6 MG/DL
EGFR: 113 ML/MIN/1.73M2
EOSINOPHIL # BLD AUTO: 0.28 K/UL
EOSINOPHIL NFR BLD AUTO: 3.8 %
ESTIMATED AVERAGE GLUCOSE: 131 MG/DL
GLUCOSE SERPL-MCNC: 91 MG/DL
HBA1C MFR BLD HPLC: 6.2 %
HCT VFR BLD CALC: 39.2 %
HDLC SERPL-MCNC: 52 MG/DL
HGB BLD-MCNC: 11.9 G/DL
IMM GRANULOCYTES NFR BLD AUTO: 0.3 %
LDLC SERPL CALC-MCNC: 102 MG/DL
LYMPHOCYTES # BLD AUTO: 1.85 K/UL
LYMPHOCYTES NFR BLD AUTO: 24.9 %
MAN DIFF?: NORMAL
MCHC RBC-ENTMCNC: 24.9 PG
MCHC RBC-ENTMCNC: 30.4 G/DL
MCV RBC AUTO: 82.2 FL
MONOCYTES # BLD AUTO: 0.5 K/UL
MONOCYTES NFR BLD AUTO: 6.7 %
NEUTROPHILS # BLD AUTO: 4.75 K/UL
NEUTROPHILS NFR BLD AUTO: 63.9 %
NONHDLC SERPL-MCNC: 113 MG/DL
PLATELET # BLD AUTO: 254 K/UL
POTASSIUM SERPL-SCNC: 4.1 MMOL/L
PROT SERPL-MCNC: 6.7 G/DL
RBC # BLD: 4.77 M/UL
RBC # FLD: 13.9 %
SODIUM SERPL-SCNC: 139 MMOL/L
TRIGL SERPL-MCNC: 53 MG/DL
TSH SERPL-ACNC: 1.88 UIU/ML
WBC # FLD AUTO: 7.43 K/UL

## 2022-10-06 ENCOUNTER — APPOINTMENT (OUTPATIENT)
Dept: OPHTHALMOLOGY | Facility: CLINIC | Age: 47
End: 2022-10-06

## 2022-10-06 ENCOUNTER — OUTPATIENT (OUTPATIENT)
Dept: OUTPATIENT SERVICES | Facility: HOSPITAL | Age: 47
LOS: 1 days | Discharge: HOME | End: 2022-10-06

## 2022-10-06 DIAGNOSIS — R22.0 LOCALIZED SWELLING, MASS AND LUMP, HEAD: Chronic | ICD-10-CM

## 2022-10-06 DIAGNOSIS — Z98.891 HISTORY OF UTERINE SCAR FROM PREVIOUS SURGERY: Chronic | ICD-10-CM

## 2022-10-06 PROCEDURE — 92004 COMPRE OPH EXAM NEW PT 1/>: CPT

## 2022-10-25 ENCOUNTER — OUTPATIENT (OUTPATIENT)
Dept: OUTPATIENT SERVICES | Facility: HOSPITAL | Age: 47
LOS: 1 days | Discharge: HOME | End: 2022-10-25

## 2022-10-25 DIAGNOSIS — R22.0 LOCALIZED SWELLING, MASS AND LUMP, HEAD: Chronic | ICD-10-CM

## 2022-10-25 DIAGNOSIS — Z98.891 HISTORY OF UTERINE SCAR FROM PREVIOUS SURGERY: Chronic | ICD-10-CM

## 2022-11-01 NOTE — PROGRESS NOTE ADULT - PROVIDER SPECIALTY LIST ADULT
----- Message from Roula Hinds RN sent at 10/18/2022 10:50 AM CDT -----  Regarding: BP  BP readings x2 wks   Internal Medicine

## 2022-11-23 ENCOUNTER — APPOINTMENT (OUTPATIENT)
Dept: GASTROENTEROLOGY | Facility: CLINIC | Age: 47
End: 2022-11-23

## 2023-01-11 ENCOUNTER — OUTPATIENT (OUTPATIENT)
Dept: OUTPATIENT SERVICES | Facility: HOSPITAL | Age: 48
LOS: 1 days | Discharge: HOME | End: 2023-01-11
Payer: OTHER GOVERNMENT

## 2023-01-11 ENCOUNTER — APPOINTMENT (OUTPATIENT)
Dept: OBGYN | Facility: CLINIC | Age: 48
End: 2023-01-11
Payer: COMMERCIAL

## 2023-01-11 VITALS — DIASTOLIC BLOOD PRESSURE: 80 MMHG | SYSTOLIC BLOOD PRESSURE: 120 MMHG | BODY MASS INDEX: 41.55 KG/M2 | WEIGHT: 192 LBS

## 2023-01-11 DIAGNOSIS — Z98.891 HISTORY OF UTERINE SCAR FROM PREVIOUS SURGERY: Chronic | ICD-10-CM

## 2023-01-11 DIAGNOSIS — R22.0 LOCALIZED SWELLING, MASS AND LUMP, HEAD: Chronic | ICD-10-CM

## 2023-01-11 PROCEDURE — 99396 PREV VISIT EST AGE 40-64: CPT

## 2023-01-11 PROCEDURE — 99213 OFFICE O/P EST LOW 20 MIN: CPT | Mod: 25

## 2023-01-11 PROCEDURE — 88142 CYTOPATH C/V THIN LAYER: CPT

## 2023-01-11 PROCEDURE — 88141 CYTOPATH C/V INTERPRET: CPT

## 2023-01-11 NOTE — HISTORY OF PRESENT ILLNESS
[FreeTextEntry1] : 48 y/o female lmp yesterday for annual. Pt also reports pain.\par Midline, lower abdomen, cramping no associated symtoms\par continuous nothing else to report [Patient reported mammogram was normal] : Patient reported mammogram was normal [Patient reported PAP Smear was normal] : Patient reported PAP Smear was normal [Y] : Patient uses contraception [Condoms] : uses condoms [Mammogramdate] : 2022 [PapSmeardate] : 2020 [ColonoscopyDate] : never willsend

## 2023-01-11 NOTE — DISCUSSION/SUMMARY
[FreeTextEntry1] : #1 health maintenance\par \par #2 pelvic pain\par upreg\par udip\par tvs\par culture of urine sent

## 2023-01-20 LAB — HPV HIGH+LOW RISK DNA PNL CVX: NOT DETECTED

## 2023-02-10 LAB — BACTERIA UR CULT: NORMAL

## 2023-02-15 ENCOUNTER — APPOINTMENT (OUTPATIENT)
Dept: OBGYN | Facility: CLINIC | Age: 48
End: 2023-02-15
Payer: COMMERCIAL

## 2023-02-15 ENCOUNTER — APPOINTMENT (OUTPATIENT)
Dept: OBGYN | Facility: CLINIC | Age: 48
End: 2023-02-15

## 2023-02-15 ENCOUNTER — OUTPATIENT (OUTPATIENT)
Dept: OUTPATIENT SERVICES | Facility: HOSPITAL | Age: 48
LOS: 1 days | End: 2023-02-15
Payer: COMMERCIAL

## 2023-02-15 VITALS
BODY MASS INDEX: 43.15 KG/M2 | WEIGHT: 200 LBS | DIASTOLIC BLOOD PRESSURE: 85 MMHG | SYSTOLIC BLOOD PRESSURE: 120 MMHG | HEIGHT: 57 IN

## 2023-02-15 DIAGNOSIS — Z98.891 HISTORY OF UTERINE SCAR FROM PREVIOUS SURGERY: Chronic | ICD-10-CM

## 2023-02-15 DIAGNOSIS — Z00.00 ENCOUNTER FOR GENERAL ADULT MEDICAL EXAMINATION WITHOUT ABNORMAL FINDINGS: ICD-10-CM

## 2023-02-15 DIAGNOSIS — R87.615 UNSATISFACTORY CYTOLOGIC SMEAR OF CERVIX: ICD-10-CM

## 2023-02-15 DIAGNOSIS — R22.0 LOCALIZED SWELLING, MASS AND LUMP, HEAD: Chronic | ICD-10-CM

## 2023-02-15 LAB — CYTOLOGY CVX/VAG DOC THIN PREP: ABNORMAL

## 2023-02-15 PROCEDURE — 99212 OFFICE O/P EST SF 10 MIN: CPT

## 2023-02-15 PROCEDURE — 88142 CYTOPATH C/V THIN LAYER: CPT

## 2023-02-15 NOTE — HISTORY OF PRESENT ILLNESS
[FreeTextEntry1] : 48 y/o female with unsatisfacotry pap for repeat\par  used\par \par \par pt had questions-reviewed and answered

## 2023-02-17 DIAGNOSIS — R87.615 UNSATISFACTORY CYTOLOGIC SMEAR OF CERVIX: ICD-10-CM

## 2023-02-19 LAB — CYTOLOGY CVX/VAG DOC THIN PREP: ABNORMAL

## 2023-03-01 ENCOUNTER — APPOINTMENT (OUTPATIENT)
Dept: ANTEPARTUM | Facility: CLINIC | Age: 48
End: 2023-03-01
Payer: COMMERCIAL

## 2023-03-01 ENCOUNTER — OUTPATIENT (OUTPATIENT)
Dept: OUTPATIENT SERVICES | Facility: HOSPITAL | Age: 48
LOS: 1 days | End: 2023-03-01
Payer: COMMERCIAL

## 2023-03-01 ENCOUNTER — APPOINTMENT (OUTPATIENT)
Dept: ANTEPARTUM | Facility: CLINIC | Age: 48
End: 2023-03-01

## 2023-03-01 ENCOUNTER — ASOB RESULT (OUTPATIENT)
Age: 48
End: 2023-03-01

## 2023-03-01 DIAGNOSIS — Z98.891 HISTORY OF UTERINE SCAR FROM PREVIOUS SURGERY: Chronic | ICD-10-CM

## 2023-03-01 DIAGNOSIS — E66.9 OBESITY, UNSPECIFIED: ICD-10-CM

## 2023-03-01 DIAGNOSIS — R10.2 PELVIC AND PERINEAL PAIN: ICD-10-CM

## 2023-03-01 DIAGNOSIS — Z00.00 ENCOUNTER FOR GENERAL ADULT MEDICAL EXAMINATION WITHOUT ABNORMAL FINDINGS: ICD-10-CM

## 2023-03-01 DIAGNOSIS — R22.0 LOCALIZED SWELLING, MASS AND LUMP, HEAD: Chronic | ICD-10-CM

## 2023-03-01 PROCEDURE — 76830 TRANSVAGINAL US NON-OB: CPT | Mod: 26

## 2023-03-01 PROCEDURE — 76830 TRANSVAGINAL US NON-OB: CPT

## 2023-03-17 ENCOUNTER — APPOINTMENT (OUTPATIENT)
Dept: INTERNAL MEDICINE | Facility: CLINIC | Age: 48
End: 2023-03-17

## 2023-03-22 ENCOUNTER — OUTPATIENT (OUTPATIENT)
Dept: OUTPATIENT SERVICES | Facility: HOSPITAL | Age: 48
LOS: 1 days | End: 2023-03-22
Payer: COMMERCIAL

## 2023-03-22 ENCOUNTER — APPOINTMENT (OUTPATIENT)
Dept: OBGYN | Facility: CLINIC | Age: 48
End: 2023-03-22
Payer: COMMERCIAL

## 2023-03-22 VITALS — SYSTOLIC BLOOD PRESSURE: 118 MMHG | BODY MASS INDEX: 42.41 KG/M2 | WEIGHT: 196 LBS | DIASTOLIC BLOOD PRESSURE: 71 MMHG

## 2023-03-22 DIAGNOSIS — R10.2 PELVIC AND PERINEAL PAIN: ICD-10-CM

## 2023-03-22 DIAGNOSIS — Z00.00 ENCOUNTER FOR GENERAL ADULT MEDICAL EXAMINATION WITHOUT ABNORMAL FINDINGS: ICD-10-CM

## 2023-03-22 DIAGNOSIS — Z98.891 HISTORY OF UTERINE SCAR FROM PREVIOUS SURGERY: Chronic | ICD-10-CM

## 2023-03-22 DIAGNOSIS — R22.0 LOCALIZED SWELLING, MASS AND LUMP, HEAD: Chronic | ICD-10-CM

## 2023-03-22 PROCEDURE — 99212 OFFICE O/P EST SF 10 MIN: CPT

## 2023-03-22 NOTE — HISTORY OF PRESENT ILLNESS
[FreeTextEntry1] : 48 y/o female for discussion of pain. Pt has known gastritis wondering if that’s the cause, also told colitis\par gyn w/o neg, Pain associated with bloating\par tvs normal\par \par repeat pap neg\par

## 2023-03-23 DIAGNOSIS — R10.2 PELVIC AND PERINEAL PAIN: ICD-10-CM

## 2023-03-31 ENCOUNTER — APPOINTMENT (OUTPATIENT)
Dept: GASTROENTEROLOGY | Facility: CLINIC | Age: 48
End: 2023-03-31

## 2023-04-17 ENCOUNTER — RESULT REVIEW (OUTPATIENT)
Age: 48
End: 2023-04-17

## 2023-04-17 ENCOUNTER — OUTPATIENT (OUTPATIENT)
Dept: OUTPATIENT SERVICES | Facility: HOSPITAL | Age: 48
LOS: 1 days | End: 2023-04-17
Payer: COMMERCIAL

## 2023-04-17 DIAGNOSIS — R22.0 LOCALIZED SWELLING, MASS AND LUMP, HEAD: Chronic | ICD-10-CM

## 2023-04-17 DIAGNOSIS — Z98.891 HISTORY OF UTERINE SCAR FROM PREVIOUS SURGERY: Chronic | ICD-10-CM

## 2023-04-17 DIAGNOSIS — Z12.31 ENCOUNTER FOR SCREENING MAMMOGRAM FOR MALIGNANT NEOPLASM OF BREAST: ICD-10-CM

## 2023-04-17 PROCEDURE — 77063 BREAST TOMOSYNTHESIS BI: CPT

## 2023-04-17 PROCEDURE — 77067 SCR MAMMO BI INCL CAD: CPT | Mod: 26

## 2023-04-17 PROCEDURE — 77067 SCR MAMMO BI INCL CAD: CPT

## 2023-04-17 PROCEDURE — 77063 BREAST TOMOSYNTHESIS BI: CPT | Mod: 26

## 2023-04-18 DIAGNOSIS — Z12.31 ENCOUNTER FOR SCREENING MAMMOGRAM FOR MALIGNANT NEOPLASM OF BREAST: ICD-10-CM

## 2023-06-13 NOTE — HISTORY OF PRESENT ILLNESS
[TextBox_4] : 46Y/O F with PMH of obesity, knee Osteoarthritis, scalp Schwannoma is here for f/u after sleep studies were done. Patient reports feeling sleepy during the day and tiredness as well. she reports she needs to nap during to day. \par \par  Donor Site Anesthesia Type: same as repair anesthesia

## 2023-09-21 NOTE — DISCHARGE NOTE PROVIDER - NSDCQMAMI_CARD_ALL_CORE
Called CT to check on patient imaging status, s/w Jayde who reports after current trauma she will come get patient.   No

## 2023-12-20 NOTE — ED PROVIDER NOTE - CARE PLAN
Patient had questions re ECV. Specifically, whether pain medications or regional analgesia will be utilized. I reviewed that I do not customarily advise regional analgesia. I remain concerned that a theoretical risk of neuraxial anesthesia is that abolishing the mother's sensation may allow excessive use of force and thus increase the risk of complications    I briefly reviewed the procedure but stated that I would discuss in greater detail tomorrow prior to attempted ECV. Dara Nieves. Karlee Anaya M.D.   Maternal-Fetal Medicine Principal Discharge DX:	Foot ulcer   Principal Discharge DX:	Foreign body in foot

## 2024-01-24 ENCOUNTER — APPOINTMENT (OUTPATIENT)
Dept: OBGYN | Facility: CLINIC | Age: 49
End: 2024-01-24

## 2024-01-24 ENCOUNTER — APPOINTMENT (OUTPATIENT)
Dept: OBGYN | Facility: CLINIC | Age: 49
End: 2024-01-24
Payer: COMMERCIAL

## 2024-01-24 ENCOUNTER — OUTPATIENT (OUTPATIENT)
Dept: OUTPATIENT SERVICES | Facility: HOSPITAL | Age: 49
LOS: 1 days | End: 2024-01-24
Payer: COMMERCIAL

## 2024-01-24 VITALS
OXYGEN SATURATION: 100 % | HEIGHT: 57 IN | TEMPERATURE: 98 F | HEART RATE: 90 BPM | RESPIRATION RATE: 18 BRPM | BODY MASS INDEX: 44.44 KG/M2 | DIASTOLIC BLOOD PRESSURE: 63 MMHG | WEIGHT: 206 LBS | SYSTOLIC BLOOD PRESSURE: 120 MMHG

## 2024-01-24 DIAGNOSIS — Z01.419 ENCOUNTER FOR GYNECOLOGICAL EXAMINATION (GENERAL) (ROUTINE) WITHOUT ABNORMAL FINDINGS: ICD-10-CM

## 2024-01-24 DIAGNOSIS — Z98.891 HISTORY OF UTERINE SCAR FROM PREVIOUS SURGERY: Chronic | ICD-10-CM

## 2024-01-24 DIAGNOSIS — R22.0 LOCALIZED SWELLING, MASS AND LUMP, HEAD: Chronic | ICD-10-CM

## 2024-01-24 DIAGNOSIS — Z00.00 ENCOUNTER FOR GENERAL ADULT MEDICAL EXAMINATION W/OUT ABNORMAL FINDINGS: ICD-10-CM

## 2024-01-24 PROCEDURE — 99396 PREV VISIT EST AGE 40-64: CPT

## 2024-01-24 NOTE — HISTORY OF PRESENT ILLNESS
[FreeTextEntry1] : 49 y/o female for annual no complaints [Mammogramdate] : 23 [PapSmeardate] : 23 [ColonoscopyDate] : never

## 2024-01-25 DIAGNOSIS — Z00.00 ENCOUNTER FOR GENERAL ADULT MEDICAL EXAMINATION WITHOUT ABNORMAL FINDINGS: ICD-10-CM

## 2024-03-27 ENCOUNTER — APPOINTMENT (OUTPATIENT)
Dept: OBGYN | Facility: CLINIC | Age: 49
End: 2024-03-27

## 2024-04-19 ENCOUNTER — OUTPATIENT (OUTPATIENT)
Dept: OUTPATIENT SERVICES | Facility: HOSPITAL | Age: 49
LOS: 1 days | End: 2024-04-19
Payer: COMMERCIAL

## 2024-04-19 ENCOUNTER — RESULT REVIEW (OUTPATIENT)
Age: 49
End: 2024-04-19

## 2024-04-19 DIAGNOSIS — Z98.891 HISTORY OF UTERINE SCAR FROM PREVIOUS SURGERY: Chronic | ICD-10-CM

## 2024-04-19 DIAGNOSIS — Z12.31 ENCOUNTER FOR SCREENING MAMMOGRAM FOR MALIGNANT NEOPLASM OF BREAST: ICD-10-CM

## 2024-04-19 DIAGNOSIS — R22.0 LOCALIZED SWELLING, MASS AND LUMP, HEAD: Chronic | ICD-10-CM

## 2024-04-19 PROCEDURE — 77067 SCR MAMMO BI INCL CAD: CPT

## 2024-04-19 PROCEDURE — 77063 BREAST TOMOSYNTHESIS BI: CPT

## 2024-04-19 PROCEDURE — 77063 BREAST TOMOSYNTHESIS BI: CPT | Mod: 26

## 2024-04-19 PROCEDURE — 77067 SCR MAMMO BI INCL CAD: CPT | Mod: 26

## 2024-04-20 DIAGNOSIS — Z12.31 ENCOUNTER FOR SCREENING MAMMOGRAM FOR MALIGNANT NEOPLASM OF BREAST: ICD-10-CM

## 2024-05-06 ENCOUNTER — EMERGENCY (EMERGENCY)
Facility: HOSPITAL | Age: 49
LOS: 0 days | Discharge: HOME | End: 2024-05-06
Attending: EMERGENCY MEDICINE
Payer: COMMERCIAL

## 2024-05-06 VITALS
OXYGEN SATURATION: 100 % | HEART RATE: 79 BPM | DIASTOLIC BLOOD PRESSURE: 74 MMHG | SYSTOLIC BLOOD PRESSURE: 114 MMHG | RESPIRATION RATE: 18 BRPM | WEIGHT: 199.96 LBS | TEMPERATURE: 98 F | HEIGHT: 58 IN

## 2024-05-06 DIAGNOSIS — R19.7 DIARRHEA, UNSPECIFIED: ICD-10-CM

## 2024-05-06 DIAGNOSIS — Z87.19 PERSONAL HISTORY OF OTHER DISEASES OF THE DIGESTIVE SYSTEM: ICD-10-CM

## 2024-05-06 DIAGNOSIS — R22.0 LOCALIZED SWELLING, MASS AND LUMP, HEAD: Chronic | ICD-10-CM

## 2024-05-06 DIAGNOSIS — R11.2 NAUSEA WITH VOMITING, UNSPECIFIED: ICD-10-CM

## 2024-05-06 DIAGNOSIS — Z98.891 HISTORY OF UTERINE SCAR FROM PREVIOUS SURGERY: Chronic | ICD-10-CM

## 2024-05-06 DIAGNOSIS — R35.0 FREQUENCY OF MICTURITION: ICD-10-CM

## 2024-05-06 DIAGNOSIS — R10.31 RIGHT LOWER QUADRANT PAIN: ICD-10-CM

## 2024-05-06 DIAGNOSIS — R10.30 LOWER ABDOMINAL PAIN, UNSPECIFIED: ICD-10-CM

## 2024-05-06 DIAGNOSIS — R30.0 DYSURIA: ICD-10-CM

## 2024-05-06 DIAGNOSIS — R50.9 FEVER, UNSPECIFIED: ICD-10-CM

## 2024-05-06 DIAGNOSIS — K80.20 CALCULUS OF GALLBLADDER WITHOUT CHOLECYSTITIS WITHOUT OBSTRUCTION: ICD-10-CM

## 2024-05-06 DIAGNOSIS — M19.90 UNSPECIFIED OSTEOARTHRITIS, UNSPECIFIED SITE: ICD-10-CM

## 2024-05-06 DIAGNOSIS — R73.03 PREDIABETES: ICD-10-CM

## 2024-05-06 DIAGNOSIS — G89.29 OTHER CHRONIC PAIN: ICD-10-CM

## 2024-05-06 LAB
ALBUMIN SERPL ELPH-MCNC: 4.3 G/DL — SIGNIFICANT CHANGE UP (ref 3.5–5.2)
ALP SERPL-CCNC: 103 U/L — SIGNIFICANT CHANGE UP (ref 30–115)
ALT FLD-CCNC: 27 U/L — SIGNIFICANT CHANGE UP (ref 0–41)
ANION GAP SERPL CALC-SCNC: 9 MMOL/L — SIGNIFICANT CHANGE UP (ref 7–14)
APPEARANCE UR: ABNORMAL
AST SERPL-CCNC: 20 U/L — SIGNIFICANT CHANGE UP (ref 0–41)
BACTERIA # UR AUTO: ABNORMAL /HPF
BASOPHILS # BLD AUTO: 0.03 K/UL — SIGNIFICANT CHANGE UP (ref 0–0.2)
BASOPHILS NFR BLD AUTO: 0.4 % — SIGNIFICANT CHANGE UP (ref 0–1)
BILIRUB SERPL-MCNC: <0.2 MG/DL — SIGNIFICANT CHANGE UP (ref 0.2–1.2)
BILIRUB UR-MCNC: NEGATIVE — SIGNIFICANT CHANGE UP
BUN SERPL-MCNC: 11 MG/DL — SIGNIFICANT CHANGE UP (ref 10–20)
CALCIUM SERPL-MCNC: 8.8 MG/DL — SIGNIFICANT CHANGE UP (ref 8.4–10.5)
CAST: 0 /LPF — SIGNIFICANT CHANGE UP (ref 0–4)
CHLORIDE SERPL-SCNC: 102 MMOL/L — SIGNIFICANT CHANGE UP (ref 98–110)
CO2 SERPL-SCNC: 22 MMOL/L — SIGNIFICANT CHANGE UP (ref 17–32)
COLOR SPEC: YELLOW — SIGNIFICANT CHANGE UP
CREAT SERPL-MCNC: 0.6 MG/DL — LOW (ref 0.7–1.5)
DIFF PNL FLD: NEGATIVE — SIGNIFICANT CHANGE UP
EGFR: 111 ML/MIN/1.73M2 — SIGNIFICANT CHANGE UP
EOSINOPHIL # BLD AUTO: 0.24 K/UL — SIGNIFICANT CHANGE UP (ref 0–0.7)
EOSINOPHIL NFR BLD AUTO: 2.9 % — SIGNIFICANT CHANGE UP (ref 0–8)
GLUCOSE SERPL-MCNC: 100 MG/DL — HIGH (ref 70–99)
GLUCOSE UR QL: NEGATIVE MG/DL — SIGNIFICANT CHANGE UP
HCG SERPL QL: NEGATIVE — SIGNIFICANT CHANGE UP
HCG SERPL-ACNC: <1 MIU/ML — SIGNIFICANT CHANGE UP
HCT VFR BLD CALC: 36.1 % — LOW (ref 37–47)
HGB BLD-MCNC: 11.7 G/DL — LOW (ref 12–16)
IMM GRANULOCYTES NFR BLD AUTO: 0.4 % — HIGH (ref 0.1–0.3)
KETONES UR-MCNC: NEGATIVE MG/DL — SIGNIFICANT CHANGE UP
LACTATE SERPL-SCNC: 0.7 MMOL/L — SIGNIFICANT CHANGE UP (ref 0.7–2)
LEUKOCYTE ESTERASE UR-ACNC: NEGATIVE — SIGNIFICANT CHANGE UP
LIDOCAIN IGE QN: 19 U/L — SIGNIFICANT CHANGE UP (ref 7–60)
LYMPHOCYTES # BLD AUTO: 1.74 K/UL — SIGNIFICANT CHANGE UP (ref 1.2–3.4)
LYMPHOCYTES # BLD AUTO: 21.2 % — SIGNIFICANT CHANGE UP (ref 20.5–51.1)
MCHC RBC-ENTMCNC: 25.7 PG — LOW (ref 27–31)
MCHC RBC-ENTMCNC: 32.4 G/DL — SIGNIFICANT CHANGE UP (ref 32–37)
MCV RBC AUTO: 79.2 FL — LOW (ref 81–99)
MONOCYTES # BLD AUTO: 0.59 K/UL — SIGNIFICANT CHANGE UP (ref 0.1–0.6)
MONOCYTES NFR BLD AUTO: 7.2 % — SIGNIFICANT CHANGE UP (ref 1.7–9.3)
NEUTROPHILS # BLD AUTO: 5.56 K/UL — SIGNIFICANT CHANGE UP (ref 1.4–6.5)
NEUTROPHILS NFR BLD AUTO: 67.9 % — SIGNIFICANT CHANGE UP (ref 42.2–75.2)
NITRITE UR-MCNC: NEGATIVE — SIGNIFICANT CHANGE UP
NRBC # BLD: 0 /100 WBCS — SIGNIFICANT CHANGE UP (ref 0–0)
PH UR: 7.5 — SIGNIFICANT CHANGE UP (ref 5–8)
PLATELET # BLD AUTO: 261 K/UL — SIGNIFICANT CHANGE UP (ref 130–400)
PMV BLD: 10.8 FL — HIGH (ref 7.4–10.4)
POTASSIUM SERPL-MCNC: 4 MMOL/L — SIGNIFICANT CHANGE UP (ref 3.5–5)
POTASSIUM SERPL-SCNC: 4 MMOL/L — SIGNIFICANT CHANGE UP (ref 3.5–5)
PROT SERPL-MCNC: 6.8 G/DL — SIGNIFICANT CHANGE UP (ref 6–8)
PROT UR-MCNC: SIGNIFICANT CHANGE UP MG/DL
RBC # BLD: 4.56 M/UL — SIGNIFICANT CHANGE UP (ref 4.2–5.4)
RBC # FLD: 14.6 % — HIGH (ref 11.5–14.5)
RBC CASTS # UR COMP ASSIST: 6 /HPF — HIGH (ref 0–4)
SODIUM SERPL-SCNC: 133 MMOL/L — LOW (ref 135–146)
SP GR SPEC: 1.02 — SIGNIFICANT CHANGE UP (ref 1–1.03)
SQUAMOUS # UR AUTO: 11 /HPF — HIGH (ref 0–5)
UROBILINOGEN FLD QL: 1 MG/DL — SIGNIFICANT CHANGE UP (ref 0.2–1)
WBC # BLD: 8.19 K/UL — SIGNIFICANT CHANGE UP (ref 4.8–10.8)
WBC # FLD AUTO: 8.19 K/UL — SIGNIFICANT CHANGE UP (ref 4.8–10.8)
WBC UR QL: 4 /HPF — SIGNIFICANT CHANGE UP (ref 0–5)

## 2024-05-06 PROCEDURE — 74177 CT ABD & PELVIS W/CONTRAST: CPT | Mod: MC

## 2024-05-06 PROCEDURE — 36415 COLL VENOUS BLD VENIPUNCTURE: CPT

## 2024-05-06 PROCEDURE — 87086 URINE CULTURE/COLONY COUNT: CPT

## 2024-05-06 PROCEDURE — 83690 ASSAY OF LIPASE: CPT

## 2024-05-06 PROCEDURE — 81001 URINALYSIS AUTO W/SCOPE: CPT

## 2024-05-06 PROCEDURE — 84703 CHORIONIC GONADOTROPIN ASSAY: CPT

## 2024-05-06 PROCEDURE — 74177 CT ABD & PELVIS W/CONTRAST: CPT | Mod: 26,MC

## 2024-05-06 PROCEDURE — 83605 ASSAY OF LACTIC ACID: CPT

## 2024-05-06 PROCEDURE — 80053 COMPREHEN METABOLIC PANEL: CPT

## 2024-05-06 PROCEDURE — 99284 EMERGENCY DEPT VISIT MOD MDM: CPT | Mod: 25

## 2024-05-06 PROCEDURE — 85025 COMPLETE CBC W/AUTO DIFF WBC: CPT

## 2024-05-06 PROCEDURE — 96374 THER/PROPH/DIAG INJ IV PUSH: CPT | Mod: XU

## 2024-05-06 PROCEDURE — 99285 EMERGENCY DEPT VISIT HI MDM: CPT

## 2024-05-06 PROCEDURE — 84702 CHORIONIC GONADOTROPIN TEST: CPT

## 2024-05-06 RX ORDER — FAMOTIDINE 10 MG/ML
20 INJECTION INTRAVENOUS DAILY
Refills: 0 | Status: DISCONTINUED | OUTPATIENT
Start: 2024-05-06 | End: 2024-05-06

## 2024-05-06 RX ORDER — ONDANSETRON 8 MG/1
4 TABLET, FILM COATED ORAL ONCE
Refills: 0 | Status: COMPLETED | OUTPATIENT
Start: 2024-05-06 | End: 2024-05-06

## 2024-05-06 RX ORDER — CIPROFLOXACIN LACTATE 400MG/40ML
1 VIAL (ML) INTRAVENOUS
Qty: 14 | Refills: 0
Start: 2024-05-06 | End: 2024-05-12

## 2024-05-06 RX ORDER — METRONIDAZOLE 500 MG
1 TABLET ORAL
Qty: 14 | Refills: 0
Start: 2024-05-06 | End: 2024-05-12

## 2024-05-06 RX ORDER — SODIUM CHLORIDE 9 MG/ML
1000 INJECTION INTRAMUSCULAR; INTRAVENOUS; SUBCUTANEOUS ONCE
Refills: 0 | Status: COMPLETED | OUTPATIENT
Start: 2024-05-06 | End: 2024-05-06

## 2024-05-06 RX ADMIN — ONDANSETRON 4 MILLIGRAM(S): 8 TABLET, FILM COATED ORAL at 17:48

## 2024-05-06 RX ADMIN — SODIUM CHLORIDE 2000 MILLILITER(S): 9 INJECTION INTRAMUSCULAR; INTRAVENOUS; SUBCUTANEOUS at 16:43

## 2024-05-06 RX ADMIN — FAMOTIDINE 100 MILLIGRAM(S): 10 INJECTION INTRAVENOUS at 17:50

## 2024-05-06 RX ADMIN — Medication 30 MILLILITER(S): at 17:48

## 2024-05-06 NOTE — ED PROVIDER NOTE - PROGRESS NOTE DETAILS
LORI-- pt reports nausea resolved, however still having lower abdominal pain LORI-- pt reports nausea resolved, however still having lower abdominal pain. pt signed out to Dr. Dodd pending labs and imaging [place]roo: Pt has continued pain, improved, comfortable going home. Discussed CT findings with pt and stressed importance of following up with GI for colonoscopy for concern for colon cancer. PT understands. Will put in for rapid referral. Will send abx for infectious colitis

## 2024-05-06 NOTE — ED PROVIDER NOTE - PHYSICAL EXAMINATION
CONSTITUTIONAL: NAD  SKIN: Warm, dry  HEAD: NCAT  EYES: Clear conjunctiva   ENT: MMM  NECK: Supple  CARD: RRR, S1, S2; no M/R/G  RESP: Normal respiratory effort, CTAB  ABD: Soft, mild lower abdominal pain. skin folds with acanthosis nigricans. No rebound, rigidity, or guarding  EXT: Pulses palpable distally  NEURO: Grossly intact. Awake, alert, moving all extremities, no facial asymmetry.

## 2024-05-06 NOTE — ED PROVIDER NOTE - NSFOLLOWUPINSTRUCTIONS_ED_ALL_ED_FT
Seguimiento con gastroenterólogo. Nuestros coordinadores de referencias del departamento de emergencias se comunicarán con usted en las próximas 24 a  48 horas de 9:00 a. m. a 5:00 p. m. (de lunes a viernes) con eduardo kinza de seguimiento. Espere eduardo llamada telefónica del hospital en shanna período de tiempo. Si no recibe eduardo llamada o si tiene alguna pregunta o inquietud, puede comunicarse con bandaros al ([phone]) [facility]    Colitis  Colitis    La colitis es eduardo afección en la cual el colon está inflamado. Puede provocar diarrea, irineo en las heces y dolor abdominal. La colitis puede durar un breve período (ser aguda) o prolongarse mucho tiempo (volverse crónica).    ¿Cuáles son las causas?  Esta afección puede ser causada por lo siguiente:  Infecciones a causa de virus o bacterias.  Eduardo reacción a los medicamentos.  Ciertas enfermedades autoinmunitarias, quang la enfermedad de Crohn o la colitis ulcerosa.  Radioterapia.  Disminución de la irrigación sanguínea al intestino (isquemia).  ¿Cuáles son los signos o síntomas?  Los síntomas de esta afección incluyen:  Diarrea, irineo en las heces, o heces negras y alquitranadas.  Dolor en las articulaciones o dolor abdominal.  Fiebre o fatiga.  Vómitos.  Pérdida de peso.  Distensión.  Menos deposiciones que lo habitual.  Eduardo necesidad ton y repentina de tener deposiciones.  Sentir que el intestino no se vacía después de las deposiciones.  ¿Cómo se diagnostica?  Esta afección se puede diagnosticar mediante un análisis de heces y un análisis de irineo. También pueden hacerle otros estudios, por ejemplo:  Radiografías.  Exploración por tomografía computarizada (TC).  Colonoscopia.  Endoscopía.  Biopsia.  ¿Cómo se trata?  El tratamiento de esta afección depende de la causa. El tratamiento de esta afección puede incluir:  Medidas para poner en reposo el intestino, quang no comer ni beber nada byron cierto tiempo.  Administración de líquidos por vía intravenosa.  Medicamentos para el dolor y la diarrea.  Antibióticos.  Medicamentos con cortisona.  Cirugía.  Siga estas instrucciones en williamson casa:  Comida y bebida      Siga las instrucciones del médico respecto de las restricciones en las comidas o las bebidas.  Beber suficiente líquido quang para mantener la orina de color amarillo pálido.  Trabaje con un nutricionista para determinar si ciertos alimentos reagudizan williamson afección.  Evite los alimentos o las bebidas que reagudizan la afección.  Siga eduardo alimentación alexsandra equilibrada.  Instrucciones generales    Si le recetaron un antibiótico, tómelo quang se lo haya indicado el médico. No deje de loretta el antibiótico aunque comience a sentirse mejor.  Use los medicamentos de venta dayana y los recetados solamente quang se lo haya indicado el médico.  Cumpla con todas las visitas de seguimiento. [place] es importante.  Comuníquese con un médico si:  Los síntomas no desaparecen.  Tiene nuevos síntomas.  Solicite ayuda de inmediato si:  Tiene fiebre que no desaparece con tratamiento.  Comienza a sentir escalofríos.  Se siente muy débil, se desmaya o se deshidrata.  Vomita repetidas veces.  Siente dolor intenso en el abdomen.  [place] materia fecal es sanguinolenta o alquitranada.  Resumen  La colitis es eduardo afección en la cual el colon está inflamado. La colitis puede durar un breve período (ser aguda) o prolongarse mucho tiempo (volverse crónica).  El tratamiento de esta afección depende de la causa y puede incluir descanso de los intestinos, loretta medicamentos o someterse a eduardo cirugía.  Si le recetaron un antibiótico, tómelo quang se lo haya indicado el médico. No deje de loretta el antibiótico aunque comience a sentirse mejor.  Solicite ayuda de inmediato si tiene dolor intenso en el abdomen.  Cumpla con todas las visitas de seguimiento. [place] es importante.  Esta información no tiene quang fin reemplazar el consejo del médico. Asegúrese de hacerle al médico cualquier pregunta que tenga.

## 2024-05-06 NOTE — ED PROVIDER NOTE - NSPTACCESSSVCSAPPTDETAILS_ED_ALL_ED_FT
acute colitis; imaging concerning for colon cancer. Mongolian speaking. acute colitis; imaging concerning for possible colon cancer. [place] speaking. needs close follow up.

## 2024-05-06 NOTE — ED PROVIDER NOTE - PATIENT PORTAL LINK FT
You can access the FollowMyHealth Patient Portal offered by [facility] by registering at the following website: [email or web address]. By joining [employer]’s FollowMyHealth portal, you will also be able to view your health information using other applications (apps) compatible with our system.

## 2024-05-06 NOTE — ED ADULT NURSE NOTE - NSFALLUNIVINTERV_ED_ALL_ED
Bed/Stretcher in lowest position, wheels locked, appropriate side rails in place/Call bell, personal items and telephone in reach/Instruct patient to call for assistance before getting out of bed/chair/stretcher/Non-slip footwear applied when patient is off stretcher/[place] to call system/Physically safe environment - no spills, clutter or unnecessary equipment/Purposeful proactive rounding/Room/bathroom lighting operational, light cord in reach

## 2024-05-06 NOTE — ED PROVIDER NOTE - ATTENDING CONTRIBUTION TO CARE
48-year-old female past medical history of gastritis colitis gallstones prediabetes arthritis presents with 2 days of lower abdominal pain associated with subjective fever 2 days ago.  Vomited twice nonbilious nonbloody, diarrhea 6-7 times nonbloody.  Feels like prior colitis, intermittent colicky pain worse in right lower quadrant.  Does not follow with gastroenterologist and has never had colonoscopy, states she was seen by family doctor and told she has colitis.  Has dysuria and frequency but no hematuria or flank pain.  No chest pain shortness of breath.    On exam, AFVSS, Well appearing, No acute distress, NCAT, EOMI, PERRLA, MMM, Neck supple, LCTAB, RRR nl s1s2 No mrg, Abdomen Soft mild suprapubic tenderness to palpation no rebound or rigidity, no CVA tenderness, ND, AAOx3, No Focal Deficits, No LE edema or calf TTP,    A/P; abdominal pain nausea vomiting diarrhea, CT scan with concerns for colitis versus possible colon cancer, patient informed, will need p.o. antibiotics, GI referral 1 to 2 weeks as outpatient, patient informed needs colonoscopy as outpatient, strict return precautions provided

## 2024-05-07 ENCOUNTER — NON-APPOINTMENT (OUTPATIENT)
Age: 49
End: 2024-05-07

## 2024-05-07 LAB
CULTURE RESULTS: SIGNIFICANT CHANGE UP
SPECIMEN SOURCE: SIGNIFICANT CHANGE UP

## 2024-05-20 ENCOUNTER — OUTPATIENT (OUTPATIENT)
Dept: OUTPATIENT SERVICES | Facility: HOSPITAL | Age: 49
LOS: 1 days | End: 2024-05-20
Payer: COMMERCIAL

## 2024-05-20 ENCOUNTER — APPOINTMENT (OUTPATIENT)
Dept: INTERNAL MEDICINE | Facility: CLINIC | Age: 49
End: 2024-05-20

## 2024-05-20 VITALS
WEIGHT: 201 LBS | TEMPERATURE: 97.6 F | HEIGHT: 57 IN | SYSTOLIC BLOOD PRESSURE: 130 MMHG | HEART RATE: 80 BPM | BODY MASS INDEX: 43.36 KG/M2 | OXYGEN SATURATION: 96 % | DIASTOLIC BLOOD PRESSURE: 74 MMHG

## 2024-05-20 DIAGNOSIS — M25.561 PAIN IN RIGHT KNEE: ICD-10-CM

## 2024-05-20 DIAGNOSIS — R22.0 LOCALIZED SWELLING, MASS AND LUMP, HEAD: Chronic | ICD-10-CM

## 2024-05-20 DIAGNOSIS — Z00.00 ENCOUNTER FOR GENERAL ADULT MEDICAL EXAMINATION WITHOUT ABNORMAL FINDINGS: ICD-10-CM

## 2024-05-20 DIAGNOSIS — Z98.891 HISTORY OF UTERINE SCAR FROM PREVIOUS SURGERY: Chronic | ICD-10-CM

## 2024-05-20 PROCEDURE — 99214 OFFICE O/P EST MOD 30 MIN: CPT

## 2024-05-20 NOTE — HISTORY OF PRESENT ILLNESS
[FreeTextEntry1] : abdominal pain [de-identified] : 48 year old female PMH preDM, GERD here for abdominal pain. Says she has been having b/l lower quadrant pain for weeks, no changes in bowel movements otherwise. Also has occasional bouts of acid reflux for which she says she takes omeprazole and needs refill for. Right knee pain as well which worsens with movement and improves with rest. Not on NSAIDs. No other complaints

## 2024-05-20 NOTE — REVIEW OF SYSTEMS
[Abdominal Pain] : abdominal pain [Nausea] : no nausea [Vomiting] : no vomiting [Heartburn] : heartburn [Negative] : Integumentary [FreeTextEntry9] : right knee pain

## 2024-05-20 NOTE — PHYSICAL EXAM
[Normal] : no rash [de-identified] :  RLQ/LLQ abdomdinal tenderness to palpation [de-identified] : right knee tenderness to palpation, no crepitus

## 2024-05-20 NOTE — ASSESSMENT
[FreeTextEntry1] : 48 year old female PMH preDM, GERD here for abdominal pain    #Abdominal pain #GERD - RLQ/LLQ pain, no diarrhea, constipation - GI referral   #Right knee pain - not on NSAIDs - right knee xray   HCM - GI referral - right knee xray

## 2024-05-28 DIAGNOSIS — K21.9 GASTRO-ESOPHAGEAL REFLUX DISEASE WITHOUT ESOPHAGITIS: ICD-10-CM

## 2024-05-28 DIAGNOSIS — M25.561 PAIN IN RIGHT KNEE: ICD-10-CM

## 2024-05-31 ENCOUNTER — APPOINTMENT (OUTPATIENT)
Dept: GASTROENTEROLOGY | Facility: CLINIC | Age: 49
End: 2024-05-31

## 2024-05-31 ENCOUNTER — OUTPATIENT (OUTPATIENT)
Dept: OUTPATIENT SERVICES | Facility: HOSPITAL | Age: 49
LOS: 1 days | End: 2024-05-31

## 2024-05-31 VITALS
HEIGHT: 57 IN | DIASTOLIC BLOOD PRESSURE: 75 MMHG | HEART RATE: 84 BPM | WEIGHT: 204.38 LBS | BODY MASS INDEX: 44.1 KG/M2 | TEMPERATURE: 97.4 F | SYSTOLIC BLOOD PRESSURE: 113 MMHG | OXYGEN SATURATION: 97 %

## 2024-05-31 DIAGNOSIS — Z00.00 ENCOUNTER FOR GENERAL ADULT MEDICAL EXAMINATION WITHOUT ABNORMAL FINDINGS: ICD-10-CM

## 2024-05-31 DIAGNOSIS — K63.9 DISEASE OF INTESTINE, UNSPECIFIED: ICD-10-CM

## 2024-05-31 DIAGNOSIS — K21.9 GASTRO-ESOPHAGEAL REFLUX DISEASE W/OUT ESOPHAGITIS: ICD-10-CM

## 2024-05-31 DIAGNOSIS — Z98.891 HISTORY OF UTERINE SCAR FROM PREVIOUS SURGERY: Chronic | ICD-10-CM

## 2024-05-31 DIAGNOSIS — D64.9 ANEMIA, UNSPECIFIED: ICD-10-CM

## 2024-05-31 PROCEDURE — 99204 OFFICE O/P NEW MOD 45 MIN: CPT

## 2024-05-31 RX ORDER — POLYETHYLENE GLYCOL 3350 AND ELECTROLYTES WITH LEMON FLAVOR 236; 22.74; 6.74; 5.86; 2.97 G/4L; G/4L; G/4L; G/4L; G/4L
236 POWDER, FOR SOLUTION ORAL
Qty: 1 | Refills: 0 | Status: ACTIVE | COMMUNITY
Start: 2024-05-31 | End: 1900-01-01

## 2024-05-31 RX ORDER — OMEPRAZOLE 40 MG/1
40 CAPSULE, DELAYED RELEASE ORAL
Qty: 30 | Refills: 1 | Status: ACTIVE | COMMUNITY
Start: 2022-08-19 | End: 1900-01-01

## 2024-05-31 NOTE — PHYSICAL EXAM
[Alert] : alert [Sclera] : the sclera and conjunctiva were normal [Hearing Threshold Finger Rub Not Hinds] : hearing was normal [No Respiratory Distress] : no respiratory distress [No Acc Muscle Use] : no accessory muscle use [Auscultation Breath Sounds / Voice Sounds] : lungs were clear to auscultation bilaterally [Heart Rate And Rhythm] : heart rate was normal and rhythm regular [Normal S1, S2] : normal S1 and S2 [Bowel Sounds] : normal bowel sounds [Abdomen Tenderness] : non-tender [Abdomen Soft] : soft [Abnormal Walk] : normal gait [No Focal Deficits] : no focal deficits [Oriented To Time, Place, And Person] : oriented to person, place, and time

## 2024-05-31 NOTE — HISTORY OF PRESENT ILLNESS
[FreeTextEntry1] : 48 year old female PMH preDM, GERD here for abdominal pain. Says she has been having b/l lower quadrant pain for 2 months no changes in bowel movements otherwise. Also has occasional bouts of acid reflux.  Went to ED for abdominal pain noted to have sigmoid thickening colitis was given cipro and flagyl finished abx for total 5 days. Currently feeling better with partially resolved abdominal pain but not completely gone Denies dysphagia, odynophagia, weight loss, hematemesis, melena, hematochezia, nausea, vomiting.  Normal BM Social history negx3 Family history negative for GI cancers

## 2024-05-31 NOTE — ASSESSMENT
[FreeTextEntry1] : 48 year old female PMH preDM, GERD here for abdominal pain. Says she has been having b/l lower quadrant pain for 2 months no changes in bowel movements otherwise. Also has occasional bouts of acid reflux.  Went to ED for abdominal pain noted to have sigmoid thickening colitis was given cipro and flagyl finished abx for total 5 days. Currently feeling better with partially resolved abdominal pain but not completely gone  #) Abdominal pain/colitis in CT scan  -CT abdomen in 5/2024 showed thickening of sigmoid colon (focal colitis) -will schedule for colonoscopy  -ASA class II  No blood thinners Colonoscopy prep Golytely and dulcolax ordered and provided instructions to the patient clearly, risks and benefits explained Schedule colonoscopy  #) GERD >1 year  #) Mild Iron deficiency anemia  - Iron studies in 2021 showed low iron saturation  - Denies any melena or hematochezia  - will schedule for EGD for acid reflux and anemia

## 2024-06-07 ENCOUNTER — NON-APPOINTMENT (OUTPATIENT)
Age: 49
End: 2024-06-07

## 2024-06-20 ENCOUNTER — TRANSCRIPTION ENCOUNTER (OUTPATIENT)
Age: 49
End: 2024-06-20

## 2024-06-20 ENCOUNTER — OUTPATIENT (OUTPATIENT)
Dept: OUTPATIENT SERVICES | Facility: HOSPITAL | Age: 49
LOS: 1 days | Discharge: HOME | End: 2024-06-20
Payer: COMMERCIAL

## 2024-06-20 ENCOUNTER — RESULT REVIEW (OUTPATIENT)
Age: 49
End: 2024-06-20

## 2024-06-20 VITALS — HEART RATE: 70 BPM | SYSTOLIC BLOOD PRESSURE: 129 MMHG | DIASTOLIC BLOOD PRESSURE: 77 MMHG

## 2024-06-20 VITALS
OXYGEN SATURATION: 100 % | DIASTOLIC BLOOD PRESSURE: 78 MMHG | HEIGHT: 55 IN | RESPIRATION RATE: 18 BRPM | TEMPERATURE: 97 F | WEIGHT: 190.04 LBS | HEART RATE: 70 BPM | SYSTOLIC BLOOD PRESSURE: 151 MMHG

## 2024-06-20 DIAGNOSIS — K63.9 DISEASE OF INTESTINE, UNSPECIFIED: ICD-10-CM

## 2024-06-20 DIAGNOSIS — D64.9 ANEMIA, UNSPECIFIED: ICD-10-CM

## 2024-06-20 DIAGNOSIS — Z98.891 HISTORY OF UTERINE SCAR FROM PREVIOUS SURGERY: Chronic | ICD-10-CM

## 2024-06-20 DIAGNOSIS — R22.0 LOCALIZED SWELLING, MASS AND LUMP, HEAD: Chronic | ICD-10-CM

## 2024-06-20 PROCEDURE — 81025 URINE PREGNANCY TEST: CPT

## 2024-06-20 PROCEDURE — 88312 SPECIAL STAINS GROUP 1: CPT

## 2024-06-20 PROCEDURE — 45390 COLONOSCOPY W/RESECTION: CPT

## 2024-06-20 PROCEDURE — 88312 SPECIAL STAINS GROUP 1: CPT | Mod: 26

## 2024-06-20 PROCEDURE — 88305 TISSUE EXAM BY PATHOLOGIST: CPT | Mod: 26,59

## 2024-06-20 PROCEDURE — 88305 TISSUE EXAM BY PATHOLOGIST: CPT

## 2024-06-20 PROCEDURE — 43239 EGD BIOPSY SINGLE/MULTIPLE: CPT | Mod: XS

## 2024-06-20 RX ORDER — SODIUM CHLORIDE 9 MG/ML
1000 INJECTION, SOLUTION INTRAVENOUS
Refills: 0 | Status: DISCONTINUED | OUTPATIENT
Start: 2024-06-20 | End: 2024-06-20

## 2024-06-20 RX ORDER — KETAMINE HYDROCHLORIDE 100 MG/ML
50 INJECTION INTRAMUSCULAR; INTRAVENOUS ONCE
Refills: 0 | Status: COMPLETED | OUTPATIENT
Start: 2024-06-20 | End: 2024-06-20

## 2024-06-20 NOTE — ASU DISCHARGE PLAN (ADULT/PEDIATRIC) - CARE PROVIDER_API CALL
Karan Keita  Gastroenterology  [address] [place] [id number]  Phone: [phone]  Fax: [phone]  Follow Up Time:

## 2024-06-20 NOTE — ASU PATIENT PROFILE, ADULT - FALL HARM RISK - UNIVERSAL INTERVENTIONS
Bed in lowest position, wheels locked, appropriate side rails in place/Call bell, personal items and telephone in reach/Instruct patient to call for assistance before getting out of bed or chair/Non-slip footwear when patient is out of bed/[place] to call system/Physically safe environment - no spills, clutter or unnecessary equipment/Purposeful Proactive Rounding/Room/bathroom lighting operational, light cord in reach

## 2024-06-20 NOTE — PRE-ANESTHESIA EVALUATION ADULT - NSANTHASARD_GEN_ALL_CORE
[facility]    Preet Glover, Tere, Andreea, Anh, Greg, Juan Carlos      [phone]    CHIEF COMPLAINT: Patient is a 89y old  Male who presents with a chief complaint of Wheezing and SOB x 4 days (08 Jan 2020 17:58)      Follow Up: cad, paf, ppm with sob/vol ol    Interim history: reports that he is still coughing, but feeling better overall. no cp    MEDICATIONS  (STANDING):  atorvastatin 10 milliGRAM(s) Oral at bedtime  dextrose 5%. 1000 milliLiter(s) (50 mL/Hr) IV Continuous <Continuous>  dextrose 50% Injectable 12.5 Gram(s) IV Push once  dextrose 50% Injectable 25 Gram(s) IV Push once  dextrose 50% Injectable 25 Gram(s) IV Push once  digoxin     Tablet 0.125 milliGRAM(s) Oral every other day  furosemide   Injectable 40 milliGRAM(s) IV Push every 12 hours  guaiFENesin  milliGRAM(s) Oral every 12 hours  insulin lispro (HumaLOG) corrective regimen sliding scale   SubCutaneous three times a day before meals  levalbuterol Inhalation 0.63 milliGRAM(s) Inhalation every 4 hours  losartan 100 milliGRAM(s) Oral daily  metoprolol tartrate 100 milliGRAM(s) Oral two times a day  sodium chloride 3%  Inhalation 4 milliLiter(s) Inhalation every 4 hours  tamsulosin 0.8 milliGRAM(s) Oral at bedtime    MEDICATIONS  (PRN):  acetaminophen   Tablet .. 650 milliGRAM(s) Oral every 4 hours PRN Mild Pain (1 - 3)  benzonatate 100 milliGRAM(s) Oral three times a day PRN Cough  dextrose 40% Gel 15 Gram(s) Oral once PRN Blood Glucose LESS THAN 70 milliGRAM(s)/deciliter  glucagon  Injectable 1 milliGRAM(s) IntraMuscular once PRN Glucose LESS THAN 70 milligrams/deciliter      REVIEW OF SYSTEMS:  eye, ent, GI, , allergic, dermatologic, musculoskeletal and neurologic are negative except as described above    Vital Signs Last 24 Hrs  T(C): 36.4 (09 Jan 2020 05:06), Max: 36.4 (09 Jan 2020 05:06)  T(F): 97.5 (09 Jan 2020 05:06), Max: 97.5 (09 Jan 2020 05:06)  HR: 95 (09 Jan 2020 05:06) (95 - 102)  BP: 137/72 (09 Jan 2020 05:06) (111/67 - 137/72)  BP(mean): --  RR: 18 (09 Jan 2020 05:06) (18 - 18)  SpO2: 97% (09 Jan 2020 05:06) (95% - 97%)    I&O's Summary    08 Jan 2020 07:01  -  09 Jan 2020 07:00  --------------------------------------------------------  IN: 800 mL / OUT: 2775 mL / NET: -1975 mL        Telemetry past 24h: af generally controlled, 5 beats wct,     PHYSICAL EXAM:    Constitutional: well-nourished, well-developed, NAD   HEENT:  MMM, sclerae anicteric, conjunctivae clear, no oral cyanosis.  Pulmonary: Non-labored, diffuse rhonchi with coarse shirley bs  Cardiovascular: Regular, S1 and S2.  No murmur.  No rubs, gallops or clicks  Gastrointestinal: Bowel Sounds present, soft, nontender.   Lymph: No peripheral edema.   Neurological: Alert, no focal deficits  Skin: No rashes.  Psych:  Mood & affect appropriate    LABS: All Labs Reviewed:                        13.7   12.99 )-----------( 221      ( 08 Jan 2020 09:12 )             42.6                         11.9   11.73 )-----------( 178      ( 07 Jan 2020 09:28 )             36.6     09 Jan 2020 06:34    135    |  94     |  30     ----------------------------<  150    4.4     |  28     |  1.16   08 Jan 2020 06:07    138    |  97     |  24     ----------------------------<  150    4.8     |  29     |  1.03   07 Jan 2020 06:38    138    |  95     |  25     ----------------------------<  169    3.4     |  27     |  1.02     Ca    9.4        09 Jan 2020 06:34  Ca    9.6        08 Jan 2020 06:07  Ca    9.0        07 Jan 2020 06:38  Mg     2.1       08 Jan 2020 06:07  Mg     2.0       07 Jan 2020 06:38    TPro  6.1    /  Alb  3.3    /  TBili  0.7    /  DBili  x      /  AST  22     /  ALT  28     /  AlkPhos  132    07 Jan 2020 06:38    PT/INR - ( 09 Jan 2020 08:25 )   PT: 36.8 sec;   INR: 3.12 ratio         PTT - ( 09 Jan 2020 08:25 )  PTT:36.3 sec      Blood Culture: Organism --  Gram Stain Blood -- Gram Stain --  Specimen Source .Blood Blood-Peripheral  Culture-Blood --            RADIOLOGY:    EKG:    Echo: 3

## 2024-06-20 NOTE — H&P PST ADULT - HISTORY OF PRESENT ILLNESS
48 year old female PMH preDM, GERD here for abdominal pain. Says she has been having b/l lower quadrant pain for 2 months no changes in bowel movements otherwise. Also has occasional bouts of acid reflux.  Went to ED for abdominal pain noted to have sigmoid thickening colitis was given cipro and flagyl finished abx for total 5 days. Currently feeling better with partially resolved abdominal pain but not completely gone  ?  #) Abdominal pain/colitis in CT scan  -CT abdomen in 5/2024 showed thickening of sigmoid colon (focal colitis)  -will schedule for colonoscopy  -ASA class II  No blood thinners  Colonoscopy prep Golytely and dulcolax ordered and provided instructions to the patient clearly, risks and benefits explained  Schedule colonoscopy  ?  #) GERD >1 year  #) Mild Iron deficiency anemia  - Iron studies in 2021 showed low iron saturation  - Denies any melena or hematochezia  - will schedule for EGD for acid reflux and anemia.

## 2024-06-25 ENCOUNTER — NON-APPOINTMENT (OUTPATIENT)
Age: 49
End: 2024-06-25

## 2024-06-25 LAB — SURGICAL PATHOLOGY STUDY: SIGNIFICANT CHANGE UP

## 2024-07-12 ENCOUNTER — OUTPATIENT (OUTPATIENT)
Dept: OUTPATIENT SERVICES | Facility: HOSPITAL | Age: 49
LOS: 1 days | End: 2024-07-12
Payer: COMMERCIAL

## 2024-07-12 ENCOUNTER — APPOINTMENT (OUTPATIENT)
Dept: GASTROENTEROLOGY | Facility: CLINIC | Age: 49
End: 2024-07-12
Payer: COMMERCIAL

## 2024-07-12 VITALS
HEIGHT: 57 IN | BODY MASS INDEX: 43.36 KG/M2 | OXYGEN SATURATION: 97 % | WEIGHT: 201 LBS | TEMPERATURE: 97.8 F | SYSTOLIC BLOOD PRESSURE: 116 MMHG | HEART RATE: 60 BPM | DIASTOLIC BLOOD PRESSURE: 79 MMHG

## 2024-07-12 DIAGNOSIS — K21.9 GASTRO-ESOPHAGEAL REFLUX DISEASE W/OUT ESOPHAGITIS: ICD-10-CM

## 2024-07-12 DIAGNOSIS — Z00.00 ENCOUNTER FOR GENERAL ADULT MEDICAL EXAMINATION WITHOUT ABNORMAL FINDINGS: ICD-10-CM

## 2024-07-12 DIAGNOSIS — R22.0 LOCALIZED SWELLING, MASS AND LUMP, HEAD: Chronic | ICD-10-CM

## 2024-07-12 DIAGNOSIS — K63.9 DISEASE OF INTESTINE, UNSPECIFIED: ICD-10-CM

## 2024-07-12 DIAGNOSIS — Z98.891 HISTORY OF UTERINE SCAR FROM PREVIOUS SURGERY: Chronic | ICD-10-CM

## 2024-07-12 DIAGNOSIS — A04.8 OTHER SPECIFIED BACTERIAL INTESTINAL INFECTIONS: ICD-10-CM

## 2024-07-12 PROCEDURE — 99204 OFFICE O/P NEW MOD 45 MIN: CPT

## 2024-07-12 RX ORDER — OMEPRAZOLE 20 MG/1
20 CAPSULE, DELAYED RELEASE ORAL TWICE DAILY
Qty: 20 | Refills: 0 | Status: ACTIVE | COMMUNITY
Start: 2024-07-12 | End: 1900-01-01

## 2024-07-12 RX ORDER — BISMUTH SUBCITRATE POTASSIUM, METRONIDAZOLE, AND TETRACYCLINE HYDROCHLORIDE 140; 125; 125 MG/1; MG/1; MG/1
140-125-125 CAPSULE ORAL 4 TIMES DAILY
Qty: 120 | Refills: 0 | Status: ACTIVE | COMMUNITY
Start: 2024-07-12 | End: 1900-01-01

## 2024-07-23 DIAGNOSIS — A04.8 OTHER SPECIFIED BACTERIAL INTESTINAL INFECTIONS: ICD-10-CM

## 2024-07-23 DIAGNOSIS — K63.9 DISEASE OF INTESTINE, UNSPECIFIED: ICD-10-CM

## 2024-07-23 DIAGNOSIS — K21.9 GASTRO-ESOPHAGEAL REFLUX DISEASE WITHOUT ESOPHAGITIS: ICD-10-CM

## 2024-08-20 ENCOUNTER — LABORATORY RESULT (OUTPATIENT)
Age: 49
End: 2024-08-20

## 2024-08-20 ENCOUNTER — OUTPATIENT (OUTPATIENT)
Dept: OUTPATIENT SERVICES | Facility: HOSPITAL | Age: 49
LOS: 1 days | End: 2024-08-20
Payer: COMMERCIAL

## 2024-08-20 DIAGNOSIS — K21.9 GASTRO-ESOPHAGEAL REFLUX DISEASE WITHOUT ESOPHAGITIS: ICD-10-CM

## 2024-08-20 DIAGNOSIS — Z98.891 HISTORY OF UTERINE SCAR FROM PREVIOUS SURGERY: Chronic | ICD-10-CM

## 2024-08-20 DIAGNOSIS — R22.0 LOCALIZED SWELLING, MASS AND LUMP, HEAD: Chronic | ICD-10-CM

## 2024-08-20 PROCEDURE — 87493 C DIFF AMPLIFIED PROBE: CPT

## 2024-08-20 PROCEDURE — 87507 IADNA-DNA/RNA PROBE TQ 12-25: CPT

## 2024-08-20 PROCEDURE — 87338 HPYLORI STOOL AG IA: CPT

## 2024-08-21 DIAGNOSIS — K21.9 GASTRO-ESOPHAGEAL REFLUX DISEASE WITHOUT ESOPHAGITIS: ICD-10-CM

## 2024-09-03 DIAGNOSIS — K52.9 NONINFECTIVE GASTROENTERITIS AND COLITIS, UNSPECIFIED: ICD-10-CM

## 2024-09-03 RX ORDER — CIPROFLOXACIN HYDROCHLORIDE 500 MG/1
500 TABLET, FILM COATED ORAL TWICE DAILY
Qty: 10 | Refills: 0 | Status: ACTIVE | COMMUNITY
Start: 2024-09-03 | End: 1900-01-01

## 2024-10-17 ENCOUNTER — APPOINTMENT (OUTPATIENT)
Dept: INTERNAL MEDICINE | Facility: CLINIC | Age: 49
End: 2024-10-17
Payer: COMMERCIAL

## 2024-10-17 ENCOUNTER — OUTPATIENT (OUTPATIENT)
Dept: OUTPATIENT SERVICES | Facility: HOSPITAL | Age: 49
LOS: 1 days | End: 2024-10-17
Payer: COMMERCIAL

## 2024-10-17 ENCOUNTER — NON-APPOINTMENT (OUTPATIENT)
Age: 49
End: 2024-10-17

## 2024-10-17 VITALS
HEART RATE: 72 BPM | TEMPERATURE: 97.3 F | BODY MASS INDEX: 44.88 KG/M2 | SYSTOLIC BLOOD PRESSURE: 132 MMHG | OXYGEN SATURATION: 100 % | DIASTOLIC BLOOD PRESSURE: 72 MMHG | WEIGHT: 208 LBS | HEIGHT: 57 IN

## 2024-10-17 DIAGNOSIS — Z98.891 HISTORY OF UTERINE SCAR FROM PREVIOUS SURGERY: Chronic | ICD-10-CM

## 2024-10-17 DIAGNOSIS — M25.562 PAIN IN RIGHT KNEE: ICD-10-CM

## 2024-10-17 DIAGNOSIS — R73.03 PREDIABETES.: ICD-10-CM

## 2024-10-17 DIAGNOSIS — M25.561 PAIN IN RIGHT KNEE: ICD-10-CM

## 2024-10-17 DIAGNOSIS — Z00.00 ENCOUNTER FOR GENERAL ADULT MEDICAL EXAMINATION WITHOUT ABNORMAL FINDINGS: ICD-10-CM

## 2024-10-17 DIAGNOSIS — R22.0 LOCALIZED SWELLING, MASS AND LUMP, HEAD: Chronic | ICD-10-CM

## 2024-10-17 PROCEDURE — 99214 OFFICE O/P EST MOD 30 MIN: CPT

## 2024-10-22 DIAGNOSIS — A04.8 OTHER SPECIFIED BACTERIAL INTESTINAL INFECTIONS: ICD-10-CM

## 2024-10-22 DIAGNOSIS — M25.561 PAIN IN RIGHT KNEE: ICD-10-CM

## 2024-10-24 ENCOUNTER — OUTPATIENT (OUTPATIENT)
Dept: OUTPATIENT SERVICES | Facility: HOSPITAL | Age: 49
LOS: 1 days | End: 2024-10-24
Payer: COMMERCIAL

## 2024-10-24 DIAGNOSIS — Z98.891 HISTORY OF UTERINE SCAR FROM PREVIOUS SURGERY: Chronic | ICD-10-CM

## 2024-10-24 DIAGNOSIS — R22.0 LOCALIZED SWELLING, MASS AND LUMP, HEAD: Chronic | ICD-10-CM

## 2024-10-24 DIAGNOSIS — Z01.20 ENCOUNTER FOR DENTAL EXAMINATION AND CLEANING WITHOUT ABNORMAL FINDINGS: ICD-10-CM

## 2024-10-24 DIAGNOSIS — K05.6 PERIODONTAL DISEASE, UNSPECIFIED: ICD-10-CM

## 2024-10-24 PROCEDURE — D0330: CPT

## 2024-10-24 PROCEDURE — D0230: CPT

## 2024-10-24 PROCEDURE — D0274: CPT

## 2024-10-24 PROCEDURE — D0120: CPT

## 2024-10-24 PROCEDURE — D1110: CPT

## 2024-10-25 ENCOUNTER — OUTPATIENT (OUTPATIENT)
Dept: OUTPATIENT SERVICES | Facility: HOSPITAL | Age: 49
LOS: 1 days | End: 2024-10-25
Payer: COMMERCIAL

## 2024-10-25 ENCOUNTER — APPOINTMENT (OUTPATIENT)
Dept: GASTROENTEROLOGY | Facility: CLINIC | Age: 49
End: 2024-10-25

## 2024-10-25 VITALS
OXYGEN SATURATION: 98 % | SYSTOLIC BLOOD PRESSURE: 158 MMHG | HEART RATE: 75 BPM | HEIGHT: 57 IN | WEIGHT: 293 LBS | TEMPERATURE: 96.7 F | DIASTOLIC BLOOD PRESSURE: 80 MMHG | BODY MASS INDEX: 63.21 KG/M2

## 2024-10-25 DIAGNOSIS — R22.0 LOCALIZED SWELLING, MASS AND LUMP, HEAD: Chronic | ICD-10-CM

## 2024-10-25 DIAGNOSIS — K21.9 GASTRO-ESOPHAGEAL REFLUX DISEASE WITHOUT ESOPHAGITIS: ICD-10-CM

## 2024-10-25 DIAGNOSIS — Z00.00 ENCOUNTER FOR GENERAL ADULT MEDICAL EXAMINATION WITHOUT ABNORMAL FINDINGS: ICD-10-CM

## 2024-10-25 DIAGNOSIS — K21.9 GASTRO-ESOPHAGEAL REFLUX DISEASE W/OUT ESOPHAGITIS: ICD-10-CM

## 2024-10-25 DIAGNOSIS — K63.9 DISEASE OF INTESTINE, UNSPECIFIED: ICD-10-CM

## 2024-10-25 DIAGNOSIS — L60.8 OTHER NAIL DISORDERS: ICD-10-CM

## 2024-10-25 DIAGNOSIS — A04.8 OTHER SPECIFIED BACTERIAL INTESTINAL INFECTIONS: ICD-10-CM

## 2024-10-25 PROCEDURE — 99212 OFFICE O/P EST SF 10 MIN: CPT

## 2024-10-25 RX ORDER — POLYETHYLENE GLYCOL 3350 AND ELECTROLYTES WITH LEMON FLAVOR 236; 22.74; 6.74; 5.86; 2.97 G/4L; G/4L; G/4L; G/4L; G/4L
236 POWDER, FOR SOLUTION ORAL
Qty: 1 | Refills: 0 | Status: ACTIVE | COMMUNITY
Start: 2024-10-25 | End: 1900-01-01

## 2024-11-08 ENCOUNTER — APPOINTMENT (OUTPATIENT)
Dept: GASTROENTEROLOGY | Facility: CLINIC | Age: 49
End: 2024-11-08

## 2024-11-12 ENCOUNTER — OUTPATIENT (OUTPATIENT)
Dept: OUTPATIENT SERVICES | Facility: HOSPITAL | Age: 49
LOS: 1 days | End: 2024-11-12

## 2024-11-12 DIAGNOSIS — R22.0 LOCALIZED SWELLING, MASS AND LUMP, HEAD: Chronic | ICD-10-CM

## 2024-11-12 DIAGNOSIS — Z98.891 HISTORY OF UTERINE SCAR FROM PREVIOUS SURGERY: Chronic | ICD-10-CM

## 2024-11-12 DIAGNOSIS — K02.7 DENTAL ROOT CARIES: ICD-10-CM

## 2024-11-12 PROCEDURE — T1013: CPT

## 2024-11-12 PROCEDURE — D9310: CPT

## 2024-11-13 DIAGNOSIS — K02.9 DENTAL CARIES, UNSPECIFIED: ICD-10-CM

## 2024-12-06 ENCOUNTER — APPOINTMENT (OUTPATIENT)
Dept: INTERNAL MEDICINE | Facility: CLINIC | Age: 49
End: 2024-12-06

## 2024-12-10 ENCOUNTER — OUTPATIENT (OUTPATIENT)
Dept: OUTPATIENT SERVICES | Facility: HOSPITAL | Age: 49
LOS: 1 days | End: 2024-12-10
Payer: COMMERCIAL

## 2024-12-10 DIAGNOSIS — Z98.891 HISTORY OF UTERINE SCAR FROM PREVIOUS SURGERY: Chronic | ICD-10-CM

## 2024-12-10 DIAGNOSIS — R22.0 LOCALIZED SWELLING, MASS AND LUMP, HEAD: Chronic | ICD-10-CM

## 2024-12-10 DIAGNOSIS — K02.7 DENTAL ROOT CARIES: ICD-10-CM

## 2024-12-10 PROCEDURE — D0170: CPT

## 2024-12-11 DIAGNOSIS — K02.7 DENTAL ROOT CARIES: ICD-10-CM

## 2025-01-21 ENCOUNTER — OUTPATIENT (OUTPATIENT)
Dept: OUTPATIENT SERVICES | Facility: HOSPITAL | Age: 50
LOS: 1 days | End: 2025-01-21

## 2025-01-21 DIAGNOSIS — K02.7 DENTAL ROOT CARIES: ICD-10-CM

## 2025-01-21 DIAGNOSIS — Z98.891 HISTORY OF UTERINE SCAR FROM PREVIOUS SURGERY: Chronic | ICD-10-CM

## 2025-01-21 DIAGNOSIS — R22.0 LOCALIZED SWELLING, MASS AND LUMP, HEAD: Chronic | ICD-10-CM

## 2025-01-29 ENCOUNTER — OUTPATIENT (OUTPATIENT)
Dept: OUTPATIENT SERVICES | Facility: HOSPITAL | Age: 50
LOS: 1 days | End: 2025-01-29
Payer: COMMERCIAL

## 2025-01-29 ENCOUNTER — APPOINTMENT (OUTPATIENT)
Dept: OBGYN | Facility: CLINIC | Age: 50
End: 2025-01-29
Payer: COMMERCIAL

## 2025-01-29 ENCOUNTER — NON-APPOINTMENT (OUTPATIENT)
Age: 50
End: 2025-01-29

## 2025-01-29 VITALS — WEIGHT: 208 LBS | DIASTOLIC BLOOD PRESSURE: 78 MMHG | SYSTOLIC BLOOD PRESSURE: 122 MMHG | BODY MASS INDEX: 45.01 KG/M2

## 2025-01-29 DIAGNOSIS — R10.2 PELVIC AND PERINEAL PAIN: ICD-10-CM

## 2025-01-29 DIAGNOSIS — Z00.00 ENCOUNTER FOR GENERAL ADULT MEDICAL EXAMINATION W/OUT ABNORMAL FINDINGS: ICD-10-CM

## 2025-01-29 DIAGNOSIS — Z98.891 HISTORY OF UTERINE SCAR FROM PREVIOUS SURGERY: Chronic | ICD-10-CM

## 2025-01-29 DIAGNOSIS — Z01.419 ENCOUNTER FOR GYNECOLOGICAL EXAMINATION (GENERAL) (ROUTINE) WITHOUT ABNORMAL FINDINGS: ICD-10-CM

## 2025-01-29 DIAGNOSIS — R22.0 LOCALIZED SWELLING, MASS AND LUMP, HEAD: Chronic | ICD-10-CM

## 2025-01-29 DIAGNOSIS — K02.9 DENTAL CARIES, UNSPECIFIED: ICD-10-CM

## 2025-01-29 PROCEDURE — 88142 CYTOPATH C/V THIN LAYER: CPT

## 2025-01-29 PROCEDURE — 99459 PELVIC EXAMINATION: CPT

## 2025-01-29 PROCEDURE — 99213 OFFICE O/P EST LOW 20 MIN: CPT | Mod: 25

## 2025-01-29 PROCEDURE — 99386 PREV VISIT NEW AGE 40-64: CPT

## 2025-01-29 PROCEDURE — 99396 PREV VISIT EST AGE 40-64: CPT

## 2025-01-29 PROCEDURE — 87086 URINE CULTURE/COLONY COUNT: CPT

## 2025-01-29 PROCEDURE — 87624 HPV HI-RISK TYP POOLED RSLT: CPT

## 2025-01-30 ENCOUNTER — OUTPATIENT (OUTPATIENT)
Dept: OUTPATIENT SERVICES | Facility: HOSPITAL | Age: 50
LOS: 1 days | End: 2025-01-30

## 2025-01-30 DIAGNOSIS — R10.2 PELVIC AND PERINEAL PAIN: ICD-10-CM

## 2025-01-30 DIAGNOSIS — R22.0 LOCALIZED SWELLING, MASS AND LUMP, HEAD: Chronic | ICD-10-CM

## 2025-01-30 DIAGNOSIS — Z98.891 HISTORY OF UTERINE SCAR FROM PREVIOUS SURGERY: Chronic | ICD-10-CM

## 2025-01-30 DIAGNOSIS — Z00.00 ENCOUNTER FOR GENERAL ADULT MEDICAL EXAMINATION WITHOUT ABNORMAL FINDINGS: ICD-10-CM

## 2025-01-30 LAB — HCG UR QL: NEGATIVE

## 2025-01-31 DIAGNOSIS — R10.2 PELVIC AND PERINEAL PAIN: ICD-10-CM

## 2025-02-05 LAB
BACTERIA UR CULT: NORMAL
HPV HIGH+LOW RISK DNA PNL CVX: NOT DETECTED

## 2025-02-07 LAB — CYTOLOGY CVX/VAG DOC THIN PREP: NORMAL

## 2025-02-20 ENCOUNTER — APPOINTMENT (OUTPATIENT)
Dept: INTERNAL MEDICINE | Facility: CLINIC | Age: 50
End: 2025-02-20

## 2025-02-25 ENCOUNTER — OUTPATIENT (OUTPATIENT)
Dept: OUTPATIENT SERVICES | Facility: HOSPITAL | Age: 50
LOS: 1 days | End: 2025-02-25
Payer: COMMERCIAL

## 2025-02-25 ENCOUNTER — APPOINTMENT (OUTPATIENT)
Dept: ANTEPARTUM | Facility: CLINIC | Age: 50
End: 2025-02-25
Payer: COMMERCIAL

## 2025-02-25 ENCOUNTER — ASOB RESULT (OUTPATIENT)
Age: 50
End: 2025-02-25

## 2025-02-25 DIAGNOSIS — Z00.00 ENCOUNTER FOR GENERAL ADULT MEDICAL EXAMINATION WITHOUT ABNORMAL FINDINGS: ICD-10-CM

## 2025-02-25 DIAGNOSIS — Z98.891 HISTORY OF UTERINE SCAR FROM PREVIOUS SURGERY: Chronic | ICD-10-CM

## 2025-02-25 DIAGNOSIS — R22.0 LOCALIZED SWELLING, MASS AND LUMP, HEAD: Chronic | ICD-10-CM

## 2025-02-25 PROCEDURE — 76857 US EXAM PELVIC LIMITED: CPT | Mod: 26,59

## 2025-02-25 PROCEDURE — 76830 TRANSVAGINAL US NON-OB: CPT

## 2025-02-25 PROCEDURE — 76830 TRANSVAGINAL US NON-OB: CPT | Mod: 26

## 2025-02-25 PROCEDURE — 76856 US EXAM PELVIC COMPLETE: CPT

## 2025-02-26 DIAGNOSIS — E66.9 OBESITY, UNSPECIFIED: ICD-10-CM

## 2025-02-26 DIAGNOSIS — R10.2 PELVIC AND PERINEAL PAIN: ICD-10-CM

## 2025-03-19 ENCOUNTER — APPOINTMENT (OUTPATIENT)
Dept: OBGYN | Facility: CLINIC | Age: 50
End: 2025-03-19
Payer: COMMERCIAL

## 2025-03-19 ENCOUNTER — OUTPATIENT (OUTPATIENT)
Dept: OUTPATIENT SERVICES | Facility: HOSPITAL | Age: 50
LOS: 1 days | End: 2025-03-19
Payer: COMMERCIAL

## 2025-03-19 VITALS — WEIGHT: 210 LBS | DIASTOLIC BLOOD PRESSURE: 82 MMHG | BODY MASS INDEX: 45.44 KG/M2 | SYSTOLIC BLOOD PRESSURE: 124 MMHG

## 2025-03-19 DIAGNOSIS — R22.0 LOCALIZED SWELLING, MASS AND LUMP, HEAD: Chronic | ICD-10-CM

## 2025-03-19 DIAGNOSIS — Z98.891 HISTORY OF UTERINE SCAR FROM PREVIOUS SURGERY: Chronic | ICD-10-CM

## 2025-03-19 DIAGNOSIS — Z71.3 DIETARY COUNSELING AND SURVEILLANCE: ICD-10-CM

## 2025-03-19 DIAGNOSIS — K57.90 DIVERTICULOSIS OF INTESTINE, PART UNSPECIFIED, W/OUT PERFORATION OR ABSCESS W/OUT BLEEDING: ICD-10-CM

## 2025-03-19 PROCEDURE — T1013: CPT

## 2025-03-19 PROCEDURE — 99212 OFFICE O/P EST SF 10 MIN: CPT

## 2025-03-20 DIAGNOSIS — K57.90 DIVERTICULOSIS OF INTESTINE, PART UNSPECIFIED, WITHOUT PERFORATION OR ABSCESS WITHOUT BLEEDING: ICD-10-CM

## 2025-04-02 ENCOUNTER — OUTPATIENT (OUTPATIENT)
Dept: OUTPATIENT SERVICES | Facility: HOSPITAL | Age: 50
LOS: 1 days | End: 2025-04-02
Payer: SELF-PAY

## 2025-04-02 DIAGNOSIS — K08.109 COMPLETE LOSS OF TEETH, UNSPECIFIED CAUSE, UNSPECIFIED CLASS: ICD-10-CM

## 2025-04-02 DIAGNOSIS — R22.0 LOCALIZED SWELLING, MASS AND LUMP, HEAD: Chronic | ICD-10-CM

## 2025-04-02 DIAGNOSIS — Z98.891 HISTORY OF UTERINE SCAR FROM PREVIOUS SURGERY: Chronic | ICD-10-CM

## 2025-04-02 PROBLEM — R06.02 SHORTNESS OF BREATH ON EXERTION: Status: RESOLVED | Noted: 2017-02-02 | Resolved: 2025-04-02

## 2025-04-02 PROCEDURE — T1013: CPT

## 2025-04-02 PROCEDURE — D2954: CPT

## 2025-04-03 DIAGNOSIS — K08.109 COMPLETE LOSS OF TEETH, UNSPECIFIED CAUSE, UNSPECIFIED CLASS: ICD-10-CM

## 2025-04-10 ENCOUNTER — NON-APPOINTMENT (OUTPATIENT)
Age: 50
End: 2025-04-10

## 2025-04-16 NOTE — H&P PST ADULT - ADMIT DATE
21-Jun-2019 Concentration (Mg/Ml) Of Additional Medication: 2.5 Ndc# (Optional): [id number] Consent: The risks of atrophy were reviewed with the patient. Detail Level: Detailed Administered By (Optional): CNR Add Option For Additional Mediation: No Concentration (Mg/Ml): 40.0 Kenalog Preparation: kenalog Total Volume (Ccs): 1

## 2025-04-23 ENCOUNTER — RESULT REVIEW (OUTPATIENT)
Age: 50
End: 2025-04-23

## 2025-04-23 ENCOUNTER — OUTPATIENT (OUTPATIENT)
Dept: OUTPATIENT SERVICES | Facility: HOSPITAL | Age: 50
LOS: 1 days | End: 2025-04-23
Payer: COMMERCIAL

## 2025-04-23 DIAGNOSIS — R22.0 LOCALIZED SWELLING, MASS AND LUMP, HEAD: Chronic | ICD-10-CM

## 2025-04-23 DIAGNOSIS — Z12.31 ENCOUNTER FOR SCREENING MAMMOGRAM FOR MALIGNANT NEOPLASM OF BREAST: ICD-10-CM

## 2025-04-23 DIAGNOSIS — Z98.891 HISTORY OF UTERINE SCAR FROM PREVIOUS SURGERY: Chronic | ICD-10-CM

## 2025-04-23 PROCEDURE — 77063 BREAST TOMOSYNTHESIS BI: CPT | Mod: 26

## 2025-04-23 PROCEDURE — 77067 SCR MAMMO BI INCL CAD: CPT | Mod: 26

## 2025-04-23 PROCEDURE — 77063 BREAST TOMOSYNTHESIS BI: CPT

## 2025-04-23 PROCEDURE — 77067 SCR MAMMO BI INCL CAD: CPT

## 2025-04-24 DIAGNOSIS — Z12.31 ENCOUNTER FOR SCREENING MAMMOGRAM FOR MALIGNANT NEOPLASM OF BREAST: ICD-10-CM

## 2025-05-01 ENCOUNTER — NON-APPOINTMENT (OUTPATIENT)
Age: 50
End: 2025-05-01

## 2025-05-02 ENCOUNTER — EMERGENCY (EMERGENCY)
Facility: HOSPITAL | Age: 50
LOS: 0 days | Discharge: HOME | End: 2025-05-03
Attending: STUDENT IN AN ORGANIZED HEALTH CARE EDUCATION/TRAINING PROGRAM
Payer: SELF-PAY

## 2025-05-02 VITALS
SYSTOLIC BLOOD PRESSURE: 113 MMHG | TEMPERATURE: 98 F | RESPIRATION RATE: 18 BRPM | HEART RATE: 65 BPM | OXYGEN SATURATION: 100 % | WEIGHT: 199.96 LBS | DIASTOLIC BLOOD PRESSURE: 65 MMHG

## 2025-05-02 DIAGNOSIS — Z98.891 HISTORY OF UTERINE SCAR FROM PREVIOUS SURGERY: Chronic | ICD-10-CM

## 2025-05-02 DIAGNOSIS — R22.0 LOCALIZED SWELLING, MASS AND LUMP, HEAD: Chronic | ICD-10-CM

## 2025-05-02 PROCEDURE — 99285 EMERGENCY DEPT VISIT HI MDM: CPT

## 2025-05-02 PROCEDURE — 99053 MED SERV 10PM-8AM 24 HR FAC: CPT

## 2025-05-02 PROCEDURE — 71250 CT THORAX DX C-: CPT | Mod: MC

## 2025-05-02 PROCEDURE — 81025 URINE PREGNANCY TEST: CPT

## 2025-05-02 PROCEDURE — 71046 X-RAY EXAM CHEST 2 VIEWS: CPT

## 2025-05-02 PROCEDURE — 99284 EMERGENCY DEPT VISIT MOD MDM: CPT | Mod: 25

## 2025-05-02 PROCEDURE — 96372 THER/PROPH/DIAG INJ SC/IM: CPT

## 2025-05-03 PROCEDURE — 71046 X-RAY EXAM CHEST 2 VIEWS: CPT | Mod: 26

## 2025-05-03 PROCEDURE — 71250 CT THORAX DX C-: CPT | Mod: 26

## 2025-05-03 RX ORDER — KETOROLAC TROMETHAMINE 30 MG/ML
30 INJECTION, SOLUTION INTRAMUSCULAR; INTRAVENOUS ONCE
Refills: 0 | Status: DISCONTINUED | OUTPATIENT
Start: 2025-05-03 | End: 2025-05-03

## 2025-05-03 RX ADMIN — KETOROLAC TROMETHAMINE 30 MILLIGRAM(S): 30 INJECTION, SOLUTION INTRAMUSCULAR; INTRAVENOUS at 01:50

## 2025-05-03 NOTE — ED PROVIDER NOTE - PATIENT PORTAL LINK FT
You can access the FollowMyHealth Patient Portal offered by [facility] by registering at the following website: [email or web address]. By joining [employer]’s FollowMyHealth portal, you will also be able to view your health information using other applications (apps) compatible with our system.

## 2025-05-03 NOTE — ED PROVIDER NOTE - NSFOLLOWUPCLINICS_GEN_ALL_ED_FT
[facility]  Pulmonary Rehab  [address] [place] [address]  Phone: [phone]  Fax:

## 2025-05-03 NOTE — ED PROVIDER NOTE - NSFOLLOWUPINSTRUCTIONS_ED_ALL_ED_FT
Pulmonary   Our Emergency Department Referral Coordinators will be reaching out to you in the next 24-48 hours from 9:00am to 5:00pm with a follow up appointment. Please expect a phone call from the hospital in that time frame. If you do not receive a call or if you have any questions or concerns, you can reach them at   [phone]     Back Pain    Back pain is very common in adults. The cause of back pain is rarely dangerous and the pain often gets better over time. The cause of your back pain may not be known and may include strain of muscles or ligaments, degeneration of the spinal disks, or arthritis. Occasionally the pain may radiate down your leg(s). Over-the-counter medicines to reduce pain and inflammation are often the most helpful. Stretching and remaining active frequently helps the healing process.     SEEK IMMEDIATE MEDICAL CARE IF YOU HAVE ANY OF THE FOLLOWING SYMPTOMS: bowel or bladder control problems, unusual weakness or numbness in your arms or legs, nausea or vomiting, abdominal pain, fever, dizziness/lightheadedness.

## 2025-05-03 NOTE — ED PROVIDER NOTE - PHYSICAL EXAMINATION
CONSTITUTIONAL: well-appearing, in NAD  SKIN: Warm dry, normal skin turgor  HEAD: NCAT  EYES: EOMI, PERRLA, no scleral icterus, conjunctiva pink  ENT: normal pharynx with no erythema or exudates  NECK: Supple; non tender. Full ROM.  CARD: RRR  RESP: clear to ausculation b/l.   ABD: soft, no rebound or guarding. TTP L lateral lower ribs.   BACK: TTP paraspinal lumbar region    EXT: Full ROM,   NEURO: normal motor. normal sensory. Normal gait.  PSYCH: Cooperative, appropriate.

## 2025-05-03 NOTE — ED ADULT NURSE NOTE - OBJECTIVE STATEMENT
Pt c/o back pain s/p fall. Pt states she fell in the bathroom two days ago and now her back is hurting

## 2025-05-03 NOTE — ED PROVIDER NOTE - OBJECTIVE STATEMENT
49-year-old-year-old female past medical history of gastritis, colitis, prediabetes presenting to the ED for rib pain and back.  Patient states 2 days ago she had a mechanical fall in the bathroom where her rib hit the side of the bathtub.  Patient is now endorsing lower back pain and left rib/left flank pain.  Patient did not take anything prior to arrival.  No Ht, no LOC, no AC use.  Patient was able to ambulate after the fall.  No nausea, vomiting, fevers, chills, chest pain

## 2025-05-03 NOTE — ED ADULT NURSE NOTE - BREATHING, MLM
PHYSICIAN NEXT STEPS:  Review Only    CHIEF COMPLAINT:  Chief Complaint/Protocol Used: Abdominal Pain - Female  Onset: abdominal pain      ASSESSMENT:  ? Onset: abdominal pain  ? Normal True  ? Location: right upper abdomin  ? Radiation: radiates to back  ? Onset: 2 days ago  ? Sudden: gradual  ? Pattern \"does The Pain Come And Go, Or Is It Constant?\"    - If Constant: intermittent  ? Severity: 4/10  ? Recurrent Symptom: November had similiar pain, syomach virus  ? Cause: unknown  ? Relieving/Aggravating Factors: maalox and ibuprofen with no relief  ? Other Symptoms: nausea, vomiting and diarrhea  ? Pregnancy: LMP 4/14  -------------------------------------------------------    DISPOSITION:  Disposition Recommendation: See Physician within 24 Hours  Questions that led to disposition:  ? [1] MODERATE pain (e.g., interferes with normal activities) AND [2] pain comes and goes (cramps) AND [3] present > 24 hours (Exception: pain with Vomiting or Diarrhea - see that Guideline)  Patient Directed To: Unspecified  Patient Intended Action: Seek care in the doctor's office       CALL NOTES:  04/29/2020 at 12:20 PM by Cristina Irvin  ? Virtual visit appointment scheduled with PCP for today.    DISPOSITION OVERRIDE/PROVIDER CONSULT:  Disposition Override: N/A  Override Source: Unspecified  Consulted with PCP: No  Consulted with On-Call Physician: No    CALLER CONTACT INFO:  Name: Li Handy (Self)  Phone 1: [phone] (Home Phone)  Phone 2: [phone] (Mobile) - Preferred  Phone 3: [phone] (Work Phone)      ENCOUNTER STARTED:  04/29/20 12:10:02 PM  ENCOUNTER ASSIGNED TO/CLOSED BY:  Cristina Irvin @ 04/29/20 12:20:19 PM      -------------------------------------------------------    CARE ADVICE given per Abdominal Pain - Female guideline.  SEE PHYSICIAN WITHIN 24 HOURS:   * IF OFFICE WILL BE OPEN: You need to be seen within the next 24 hours. Call your doctor when the office opens, and make  an appointment.  * IF OFFICE WILL BE CLOSED AND NO PCP TRIAGE: You need to be seen within the next 24 hours. An urgent care center is often a good source of care if your doctor's office is closed.  * IF OFFICE WILL BE CLOSED AND PCP TRIAGE REQUIRED: You may need to be seen within the next 24 hours. Your doctor will want to talk with you to decide what's best. I'll page the doctor now. NOTE: Since this isn't serious, hold the page between 10 pm and   7 am. Page the doctor in the morning.  * IF PATIENT HAS NO PCP: Refer patient to an Urgent Care Center or Retail Clinic. Also try to help caller find a PCP (medical home) for their future care. ?; CRAMPS: Your cramps may be due to an intestinal virus or from something that you ate. During   cramps, drink some water, then lie down and try to find a comfortable position.; DIET:    * Drink adequate fluids. Eat a bland diet.    * Avoid alcohol or caffeinated beverages    * Avoid greasy or fatty foods.; CALL BACK IF:    * Severe pain lasts over 1 hour    * Constant pain lasts over 2 hours    * You become worse.      UNDERSTANDS CARE ADVICE: Yes    AGREES WITH CARE ADVICE: No    WILL FOLLOW CARE ADVICE: No    -------------------------------------------------------   Spontaneous, unlabored and symmetrical

## 2025-05-03 NOTE — ED PROVIDER NOTE - ATTENDING CONTRIBUTION TO CARE
49 yr old f w/ no pmh who presents with back pain. Pt states that ~ 2 days ago, she had a fall in the bathroom, and landed on the L side of her upper back. Pt states that since then she has been having pain ot the area. Pt denies any nausea, vomiting, fevers, chills, hitting any other part of his body or any other medical complaints.     VITAL SIGNS: I have reviewed nursing notes and confirm.  CONSTITUTIONAL: non-toxic, well appearing  SKIN: no rash, no petechiae.  EYES: EOMI, pink conjunctiva, anicteric  ENT: tongue midline, no exudates, MMM  NECK: Supple; no meningismus, no JVD  CARD: RRR, no murmurs, equal radial pulses bilaterally 2+  CHEST: pain to palpation at the L upper flank and L lower chest, no crepitus, no swelling, no redness no deformities.   RESP: CTAB, no respiratory distress  ABD: Soft, non-tender, non-distended, no peritoneal signs, no HSM, no CVA tenderness  EXT: Normal ROM x4. No edema. No calves tenderness        49 yr old f that presents with back pain. imaging, pain management. reasess. dispo pending.

## 2025-05-08 ENCOUNTER — TRANSCRIPTION ENCOUNTER (OUTPATIENT)
Age: 50
End: 2025-05-08

## 2025-05-08 ENCOUNTER — OUTPATIENT (OUTPATIENT)
Dept: OUTPATIENT SERVICES | Facility: HOSPITAL | Age: 50
LOS: 1 days | Discharge: HOME | End: 2025-05-08
Payer: COMMERCIAL

## 2025-05-08 ENCOUNTER — RESULT REVIEW (OUTPATIENT)
Age: 50
End: 2025-05-08

## 2025-05-08 VITALS
TEMPERATURE: 98 F | WEIGHT: 199.96 LBS | OXYGEN SATURATION: 99 % | HEART RATE: 60 BPM | DIASTOLIC BLOOD PRESSURE: 78 MMHG | SYSTOLIC BLOOD PRESSURE: 125 MMHG | RESPIRATION RATE: 18 BRPM

## 2025-05-08 VITALS
OXYGEN SATURATION: 96 % | DIASTOLIC BLOOD PRESSURE: 63 MMHG | HEART RATE: 85 BPM | SYSTOLIC BLOOD PRESSURE: 135 MMHG | RESPIRATION RATE: 18 BRPM

## 2025-05-08 DIAGNOSIS — A04.8 OTHER SPECIFIED BACTERIAL INTESTINAL INFECTIONS: ICD-10-CM

## 2025-05-08 DIAGNOSIS — R22.0 LOCALIZED SWELLING, MASS AND LUMP, HEAD: Chronic | ICD-10-CM

## 2025-05-08 DIAGNOSIS — Z98.891 HISTORY OF UTERINE SCAR FROM PREVIOUS SURGERY: Chronic | ICD-10-CM

## 2025-05-08 PROCEDURE — 88305 TISSUE EXAM BY PATHOLOGIST: CPT | Mod: 26

## 2025-05-08 PROCEDURE — 45378 DIAGNOSTIC COLONOSCOPY: CPT

## 2025-05-08 PROCEDURE — 88305 TISSUE EXAM BY PATHOLOGIST: CPT

## 2025-05-08 RX ORDER — OMEPRAZOLE 20 MG/1
1 CAPSULE, DELAYED RELEASE ORAL
Refills: 0 | DISCHARGE

## 2025-05-08 NOTE — ASU PREOP CHECKLIST - VIA
Anesthesia Evaluation     NPO Solid Status: > 6 hours       Airway   Mallampati: III  TM distance: >3 FB  Neck ROM: full  possible difficult intubation  Dental - normal exam     Pulmonary - normal exam   (+) pneumonia , sleep apnea,   Cardiovascular   Exercise tolerance: good (4-7 METS)    Rhythm: regular    (+) hypertension,   (-) murmur, carotid bruits      Neuro/Psych    ROS Comment: Bipolar/schizophrenia  GI/Hepatic/Renal/Endo      Musculoskeletal     Abdominal  - normal exam   Substance History      OB/GYN          Other                                        Anesthesia Plan    ASA 3     general   (  D/W R&B of GA including but not limited to: heart, lung, liver, kidney, neurologic problems, positioning injuries, dental damage, corneal abrasion and TMJ.  .)  intravenous induction        stretcher

## 2025-05-08 NOTE — ASU PATIENT PROFILE, ADULT - NSICDXPASTMEDICALHX_GEN_ALL_CORE_FT
PAST MEDICAL HISTORY:  GERD (gastroesophageal reflux disease)     History of diverticulosis     IBS (irritable bowel syndrome)     OA (osteoarthritis)     Sleep apnea

## 2025-05-08 NOTE — H&P PST ADULT - HISTORY OF PRESENT ILLNESS
49 year old Vietnamese speaking female PMH preDM, GERD here for follow up. She underwent EGD and colono in 6/24,scheduled for fu in December.  Found to have sigmoid thickening colitis on CT 05/2024; Treated with Cipro and Flagyl.    Had colono 06/2024 with 1.5cm AC hyperplastic and poor prep on right side.  Had EGD 06/2024 revealing HP s/p RX  Scheduled for repeat colonoscopy

## 2025-05-08 NOTE — ASU DISCHARGE PLAN (ADULT/PEDIATRIC) - NS MD DC FALL RISK RISK
For information on Fall & Injury Prevention, visit: [email or web address] OR  [email or web address] OR  https://www.cdc.gov/steadi/patient.html

## 2025-05-08 NOTE — ASU DISCHARGE PLAN (ADULT/PEDIATRIC) - FINANCIAL ASSISTANCE
[facility] provides services at a reduced cost to those who are determined to be eligible through [facility]’s financial assistance program. Information regarding [facility]’s financial assistance program can be found by going to [email or web address] or by calling [phone].

## 2025-05-09 LAB — SURGICAL PATHOLOGY STUDY: SIGNIFICANT CHANGE UP

## 2025-05-13 DIAGNOSIS — K57.30 DIVERTICULOSIS OF LARGE INTESTINE WITHOUT PERFORATION OR ABSCESS WITHOUT BLEEDING: ICD-10-CM

## 2025-05-13 DIAGNOSIS — K52.9 NONINFECTIVE GASTROENTERITIS AND COLITIS, UNSPECIFIED: ICD-10-CM

## 2025-05-13 DIAGNOSIS — D12.4 BENIGN NEOPLASM OF DESCENDING COLON: ICD-10-CM

## 2025-05-13 DIAGNOSIS — G47.33 OBSTRUCTIVE SLEEP APNEA (ADULT) (PEDIATRIC): ICD-10-CM

## 2025-05-13 DIAGNOSIS — Z86.0100 PERSONAL HISTORY OF COLON POLYPS, UNSPECIFIED: ICD-10-CM

## 2025-05-13 DIAGNOSIS — D12.2 BENIGN NEOPLASM OF ASCENDING COLON: ICD-10-CM

## 2025-05-13 DIAGNOSIS — K63.5 POLYP OF COLON: ICD-10-CM

## 2025-05-13 DIAGNOSIS — K64.4 RESIDUAL HEMORRHOIDAL SKIN TAGS: ICD-10-CM

## 2025-05-13 DIAGNOSIS — K21.9 GASTRO-ESOPHAGEAL REFLUX DISEASE WITHOUT ESOPHAGITIS: ICD-10-CM

## 2025-05-13 DIAGNOSIS — D12.7 BENIGN NEOPLASM OF RECTOSIGMOID JUNCTION: ICD-10-CM

## 2025-05-23 ENCOUNTER — OUTPATIENT (OUTPATIENT)
Dept: OUTPATIENT SERVICES | Facility: HOSPITAL | Age: 50
LOS: 1 days | End: 2025-05-23
Payer: COMMERCIAL

## 2025-05-23 ENCOUNTER — APPOINTMENT (OUTPATIENT)
Dept: GASTROENTEROLOGY | Facility: CLINIC | Age: 50
End: 2025-05-23
Payer: COMMERCIAL

## 2025-05-23 VITALS
DIASTOLIC BLOOD PRESSURE: 83 MMHG | HEART RATE: 67 BPM | WEIGHT: 211 LBS | OXYGEN SATURATION: 97 % | SYSTOLIC BLOOD PRESSURE: 123 MMHG | BODY MASS INDEX: 45.52 KG/M2 | HEIGHT: 57 IN

## 2025-05-23 DIAGNOSIS — R19.7 DIARRHEA, UNSPECIFIED: ICD-10-CM

## 2025-05-23 DIAGNOSIS — K58.0 IRRITABLE BOWEL SYNDROME WITH DIARRHEA: ICD-10-CM

## 2025-05-23 DIAGNOSIS — Z00.00 ENCOUNTER FOR GENERAL ADULT MEDICAL EXAMINATION WITHOUT ABNORMAL FINDINGS: ICD-10-CM

## 2025-05-23 DIAGNOSIS — K58.9 IRRITABLE BOWEL SYNDROME, UNSPECIFIED: ICD-10-CM

## 2025-05-23 DIAGNOSIS — K21.9 GASTRO-ESOPHAGEAL REFLUX DISEASE W/OUT ESOPHAGITIS: ICD-10-CM

## 2025-05-23 DIAGNOSIS — R22.0 LOCALIZED SWELLING, MASS AND LUMP, HEAD: Chronic | ICD-10-CM

## 2025-05-23 PROBLEM — Z87.19 PERSONAL HISTORY OF OTHER DISEASES OF THE DIGESTIVE SYSTEM: Chronic | Status: ACTIVE | Noted: 2025-05-08

## 2025-05-23 PROBLEM — G47.30 SLEEP APNEA, UNSPECIFIED: Chronic | Status: ACTIVE | Noted: 2025-05-08

## 2025-05-23 PROBLEM — M19.90 UNSPECIFIED OSTEOARTHRITIS, UNSPECIFIED SITE: Chronic | Status: ACTIVE | Noted: 2025-05-08

## 2025-05-23 PROCEDURE — 99214 OFFICE O/P EST MOD 30 MIN: CPT

## 2025-05-23 RX ORDER — RIFAXIMIN 550 MG/1
550 TABLET ORAL
Qty: 42 | Refills: 4 | Status: ACTIVE | COMMUNITY
Start: 2025-05-23 | End: 1900-01-01

## 2025-05-23 RX ORDER — LACTASE 3000 UNIT
3000 TABLET ORAL 3 TIMES DAILY
Qty: 90 | Refills: 0 | Status: ACTIVE | COMMUNITY
Start: 2025-05-23 | End: 1900-01-01

## 2025-06-04 ENCOUNTER — OUTPATIENT (OUTPATIENT)
Dept: OUTPATIENT SERVICES | Facility: HOSPITAL | Age: 50
LOS: 1 days | End: 2025-06-04
Payer: COMMERCIAL

## 2025-06-04 DIAGNOSIS — Z01.20 ENCOUNTER FOR DENTAL EXAMINATION AND CLEANING WITHOUT ABNORMAL FINDINGS: ICD-10-CM

## 2025-06-04 DIAGNOSIS — R22.0 LOCALIZED SWELLING, MASS AND LUMP, HEAD: Chronic | ICD-10-CM

## 2025-06-04 DIAGNOSIS — Z98.891 HISTORY OF UTERINE SCAR FROM PREVIOUS SURGERY: Chronic | ICD-10-CM

## 2025-06-04 PROCEDURE — T1013: CPT

## 2025-06-04 PROCEDURE — D0120: CPT

## 2025-06-06 DIAGNOSIS — Z01.20 ENCOUNTER FOR DENTAL EXAMINATION AND CLEANING WITHOUT ABNORMAL FINDINGS: ICD-10-CM

## 2025-06-06 DIAGNOSIS — R19.7 DIARRHEA, UNSPECIFIED: ICD-10-CM

## 2025-06-06 DIAGNOSIS — K21.9 GASTRO-ESOPHAGEAL REFLUX DISEASE WITHOUT ESOPHAGITIS: ICD-10-CM

## 2025-06-06 DIAGNOSIS — K58.0 IRRITABLE BOWEL SYNDROME WITH DIARRHEA: ICD-10-CM

## 2025-06-25 ENCOUNTER — OUTPATIENT (OUTPATIENT)
Dept: OUTPATIENT SERVICES | Facility: HOSPITAL | Age: 50
LOS: 1 days | End: 2025-06-25

## 2025-06-25 DIAGNOSIS — Z98.891 HISTORY OF UTERINE SCAR FROM PREVIOUS SURGERY: Chronic | ICD-10-CM

## 2025-06-25 DIAGNOSIS — R22.0 LOCALIZED SWELLING, MASS AND LUMP, HEAD: Chronic | ICD-10-CM

## 2025-06-25 DIAGNOSIS — Z01.20 ENCOUNTER FOR DENTAL EXAMINATION AND CLEANING WITHOUT ABNORMAL FINDINGS: ICD-10-CM

## 2025-06-25 DIAGNOSIS — K08.409 PARTIAL LOSS OF TEETH, UNSPECIFIED CAUSE, UNSPECIFIED CLASS: ICD-10-CM

## 2025-06-25 PROCEDURE — D1110: CPT

## 2025-07-10 ENCOUNTER — APPOINTMENT (OUTPATIENT)
Dept: DERMATOLOGY | Facility: CLINIC | Age: 50
End: 2025-07-10

## 2025-07-10 ENCOUNTER — NON-APPOINTMENT (OUTPATIENT)
Age: 50
End: 2025-07-10

## 2025-08-23 ENCOUNTER — EMERGENCY (EMERGENCY)
Facility: HOSPITAL | Age: 50
LOS: 0 days | Discharge: HOME | End: 2025-08-23
Attending: EMERGENCY MEDICINE
Payer: MEDICAID

## 2025-08-23 VITALS
HEART RATE: 77 BPM | HEIGHT: 59 IN | TEMPERATURE: 98 F | WEIGHT: 215.39 LBS | RESPIRATION RATE: 17 BRPM | OXYGEN SATURATION: 99 % | DIASTOLIC BLOOD PRESSURE: 90 MMHG | SYSTOLIC BLOOD PRESSURE: 141 MMHG

## 2025-08-23 DIAGNOSIS — Z98.891 HISTORY OF UTERINE SCAR FROM PREVIOUS SURGERY: Chronic | ICD-10-CM

## 2025-08-23 DIAGNOSIS — K14.0 GLOSSITIS: ICD-10-CM

## 2025-08-23 DIAGNOSIS — R22.0 LOCALIZED SWELLING, MASS AND LUMP, HEAD: Chronic | ICD-10-CM

## 2025-08-23 DIAGNOSIS — K13.79 OTHER LESIONS OF ORAL MUCOSA: ICD-10-CM

## 2025-08-23 PROCEDURE — 99284 EMERGENCY DEPT VISIT MOD MDM: CPT

## 2025-08-27 ENCOUNTER — OUTPATIENT (OUTPATIENT)
Dept: OUTPATIENT SERVICES | Facility: HOSPITAL | Age: 50
LOS: 1 days | End: 2025-08-27
Payer: COMMERCIAL

## 2025-08-27 DIAGNOSIS — K02.9 DENTAL CARIES, UNSPECIFIED: ICD-10-CM

## 2025-08-27 DIAGNOSIS — R22.0 LOCALIZED SWELLING, MASS AND LUMP, HEAD: Chronic | ICD-10-CM

## 2025-08-27 DIAGNOSIS — Z98.891 HISTORY OF UTERINE SCAR FROM PREVIOUS SURGERY: Chronic | ICD-10-CM

## 2025-08-27 PROCEDURE — D7286: CPT

## 2025-08-28 DIAGNOSIS — K02.9 DENTAL CARIES, UNSPECIFIED: ICD-10-CM
